# Patient Record
Sex: MALE | NOT HISPANIC OR LATINO | URBAN - METROPOLITAN AREA
[De-identification: names, ages, dates, MRNs, and addresses within clinical notes are randomized per-mention and may not be internally consistent; named-entity substitution may affect disease eponyms.]

---

## 2017-06-22 ENCOUNTER — TEAM CONFERENCE (OUTPATIENT)
Dept: SLEEP MEDICINE | Facility: CLINIC | Age: 67
End: 2017-06-22

## 2017-06-22 RX ORDER — LOSARTAN POTASSIUM 100 MG/1
100 TABLET ORAL DAILY
COMMUNITY

## 2017-06-22 RX ORDER — GLIPIZIDE 10 MG/1
TABLET ORAL
COMMUNITY

## 2017-06-22 RX ORDER — SIMVASTATIN 20 MG
10 TABLET ORAL AT BEDTIME
COMMUNITY

## 2017-06-22 RX ORDER — CHLORTHALIDONE 25 MG/1
25 TABLET ORAL DAILY
COMMUNITY

## 2017-06-22 RX ORDER — METFORMIN HCL 500 MG
2000 TABLET, EXTENDED RELEASE 24 HR ORAL
COMMUNITY

## 2017-06-22 RX ORDER — PAROXETINE 40 MG/1
40 TABLET, FILM COATED ORAL EVERY MORNING
COMMUNITY

## 2017-06-22 RX ORDER — SILDENAFIL 100 MG/1
50 TABLET, FILM COATED ORAL PRN
COMMUNITY

## 2017-06-22 RX ORDER — AMLODIPINE BESYLATE 5 MG/1
2.5 TABLET ORAL DAILY
COMMUNITY

## 2017-06-22 RX ORDER — ATENOLOL 100 MG/1
100 TABLET ORAL DAILY
COMMUNITY

## 2017-06-22 NOTE — TELEPHONE ENCOUNTER
Medication and allergies updated per [facility] referral and chart notes.     Documents were scanned into chart and patient will be called to get scheduled for a consult for hypoglossal nerve stimulator.     LAURIE Nolasco  Clinic Coordinator   Registered Medical Assistant   [facility]

## 2017-06-30 ENCOUNTER — TELEPHONE (OUTPATIENT)
Dept: SLEEP MEDICINE | Facility: CLINIC | Age: 67
End: 2017-06-30

## 2017-06-30 NOTE — TELEPHONE ENCOUNTER
Called patient to get him scheduled for consult.     Wife answered and will pass along my message. I left my direct call back number and office hours.     LAURIE Nolasco  Clinic Coordinator   Registered Medical Assistant   [facility]

## 2017-08-18 ENCOUNTER — TELEPHONE (OUTPATIENT)
Dept: SLEEP MEDICINE | Facility: CLINIC | Age: 67
End: 2017-08-18

## 2017-08-18 NOTE — TELEPHONE ENCOUNTER
Attempted to reach patient to schedule consult for LINDA mills.     Left VM with my call back information.     Asked for him to call and let us know the status of getting this scheduled and if he is or is not interested in completing this.     Will continue to try to reach him.     LAURIE Nolasco  Clinic Coordinator   Registered Medical Assistant   [facility]

## 2017-11-10 ENCOUNTER — TELEPHONE (OUTPATIENT)
Dept: SLEEP MEDICINE | Facility: CLINIC | Age: 67
End: 2017-11-10

## 2017-11-20 ENCOUNTER — DOCUMENTATION ONLY (OUTPATIENT)
Dept: SLEEP MEDICINE | Facility: CLINIC | Age: 67
End: 2017-11-20

## 2017-11-20 NOTE — PROGRESS NOTES
Scheduling letter sent/faxed to [facility] to confirm that pt has been scheduled for sleep study.     LAURIE Nolasco  Sleep Clinic-Specialist,    Registered Medical Assistant   [facility]

## 2017-12-07 ENCOUNTER — OFFICE VISIT (OUTPATIENT)
Dept: SLEEP MEDICINE | Facility: CLINIC | Age: 67
End: 2017-12-07
Attending: INTERNAL MEDICINE
Payer: COMMERCIAL

## 2017-12-07 VITALS
HEIGHT: 71 IN | DIASTOLIC BLOOD PRESSURE: 81 MMHG | OXYGEN SATURATION: 96 % | BODY MASS INDEX: 30.94 KG/M2 | SYSTOLIC BLOOD PRESSURE: 143 MMHG | RESPIRATION RATE: 16 BRPM | WEIGHT: 221 LBS | HEART RATE: 60 BPM

## 2017-12-07 DIAGNOSIS — F51.04 PSYCHOPHYSIOLOGICAL INSOMNIA: ICD-10-CM

## 2017-12-07 DIAGNOSIS — G47.33 OBSTRUCTIVE SLEEP APNEA: Primary | ICD-10-CM

## 2017-12-07 PROCEDURE — 99211 OFF/OP EST MAY X REQ PHY/QHP: CPT | Mod: ZF

## 2017-12-07 RX ORDER — ZOLPIDEM TARTRATE 5 MG/1
TABLET ORAL
Qty: 1 TABLET | Refills: 0 | Status: SHIPPED | OUTPATIENT
Start: 2017-12-07 | End: 2018-02-01

## 2017-12-07 NOTE — PATIENT INSTRUCTIONS
"MY TREATMENT INFORMATION FOR SLEEP APNEA-  Nicholas Broderick    DOCTOR : ROBB  ARSENIOBRADLEY    Return to clinic after your sleep test  Bedtime 1 am  Awakening 9 am      Am I having a sleep study at a sleep center?  Make sure you have an appointment for the study before you leave!    Am I having a home sleep study?  Watch this video:  https://www.[facility].com/watch?v=CteI_GhyP9g&list=[email or web address]    Frequently asked questions:  1. What is Obstructive Sleep Apnea (KRISTA)? KRISTA is the most common type of sleep apnea. Apnea means, \"without breath.\"  Apnea is most often caused by narrowing or collapse of the upper airway as muscles relax during sleep.   Almost everyone has occasional apneas. Most people with sleep apnea have had brief interruptions at night frequently for many years.  The severity of sleep apnea is related to how frequent and severe the events are.   2. What are the consequences of KRISTA? Symptoms include: feeling sleepy during the day, snoring loudly, gasping or stopping of breathing, trouble sleeping, and occasionally morning headaches or heartburn at night.  Sleepiness can be serious and even increase the risk of falling asleep while driving. Other health consequences may include development of high blood pressure and other cardiovascular disease in persons who are susceptible. Untreated KRISTA  can contribute to heart disease, stroke and diabetes.   3. What are the treatment options? In most situations, sleep apnea is a lifelong disease that must be managed with daily therapy. Medications are not effective for sleep apnea and surgery is generally not considered until other therapies have been tried. Your treatment is your choice . Continuous Positive Airway (CPAP) works right away and is the therapy that is effective in nearly everyone. An oral device to hold your jaw forward is usually the next most reliable option. Other options include postioning devices (to keep you off your back), " weight loss, and surgery including a tongue pacing device. There is more detail about some of these options below.    Important tips for using CPAP and similar devices   Know your equipment:  CPAP is continuous positive airway pressure that prevents obstructive sleep apnea by keeping the throat from collapsing while you are sleeping. In most cases, the device is  smart  and can slowly self-adjusts if your throat collapses and keeps a record every day of how well you are treated-this information is available to you and your care team.  BPAP is bilevel positive airway pressure that keeps your throat open and also assists each breath with a pressure boost to maintain adequate breathing.  Special kinds of BPAP are used in patients who have inadequate breathing from lung or heart disease. In most cases, the device is  smart  and can slowly self-adjusts to assist breathing. Like CPAP, the device keeps a record of how well you are treated.  Your mask is your connection to the device. You get to choose what feels most comfortable and the staff will help to make sure if fits. Here: are some examples of the different masks that are available:       Key points to remember on your journey with sleep apnea:  1. Sleep study.  PAP devices often need to be adjusted during a sleep study to show that they are effective and adjusted right.  2. Good tips to remember: Try wearing just the mask during a quiet time during the day so your body adapts to wearing it. A humidifier is recommended for comfort in most cases to prevent drying of your nose and throat. Allergy medication from your provider may help you if you are having nasal congestion.  3. Getting settled-in. It takes more than one night for most of us to get used to wearing a mask. Try wearing just the mask during a quiet time during the day so your body adapts to wearing it. A humidifier is recommended for comfort in most cases. Our team will work with you carefully on the  first day and will be in contact within 4 days and again at 2 and 4 weeks for advice and remote device adjustments. Your therapy is evaluated by the device each day.   4. Use it every night. The more you are able to sleep naturally for 7-8 hours, the more likely you will have good sleep and to prevent health risks or symptoms from sleep apnea. Even if you use it 4 hours it helps. Occasionally all of us are unable to use a medical therapy, in sleep apnea, it is not dangerous to miss one night.   5. Communicate. Call our skilled team on the number provided on the first day if your visit for problems that make it difficult to wear the device. Over 2 out of 3 patients can learn to wear the device long-term with help from our team. Remember to call our team or your sleep providers if you are unable to wear the device as we may have other solutions for those who cannot adapt to mask CPAP therapy. It is recommended that you sleep your sleep provider within the first 3 months and yearly after that if you are not having problems.   Take care of your equipment. Make sure you clean your mask and tubing using directions every day and that your filter and mask are replaced as recommended or if they are not working.     BESIDES CPAP, WHAT OTHER THERAPIES ARE THERE?    Positioning Device  Positioning devices are generally used when sleep apnea is mild and only occurs on your back.This example shows a pillow that straps around the waist. It may be appropriate for those whose sleep study shows milder sleep apnea that occurs primarily when lying flat on one's back. Preliminary studies have shown benefit but effectiveness at home may need to be verified by a home sleep test. These devices are generally not covered by medical insurance.  Examples of devices that maintain sleeping on the back to prevent snoring and mild sleep apnea.    Belt type body positioner  Http://Your.MD/    Electronic  reminder  Http://nightshifttherapy.com/  Http://[email or web address].com.au/    Oral Appliance  What is oral appliance therapy?  An oral appliance device fits on your teeth at night like a retainer used after having braces. The device is made by a specialized dentist and requires several visits over 1-2 months before a manufactured device is made to fit your teeth and is adjusted to prevent your sleep apnea. Once an oral device is working properly, snoring should be improved. A home sleep test may be recommended at that time if to determine whether the sleep apnea is adequately treated.       Some things to remember:  -Oral devices are often, but not always, covered by your medical insurance. Be sure to check with your insurance provider.   -If you are referred for oral therapy, you will be given a list of specialized dentists to consider or you may choose to visit the Web site of the American Academy of Dental Sleep Medicine  -Oral devices are less likely to work if you have severe sleep apnea or are extremely overweight.     More detailed information  An oral appliance is a small acrylic device that fits over the upper and lower teeth  (similar to a retainer or a mouth guard). This device slightly moves jaw forward, which moves the base of the tongue forward, opens the airway, improves breathing for effective treat snoring and obstructive sleep apnea in perhaps 7 out of 10 people .  The best working devices are custom-made by a dental device  after a mold is made of the teeth 1, 2, 3.  When is an oral appliance indicated?  Oral appliance therapy is recommended as a first-line treatment for patients with primary snoring, mild sleep apnea, and for patients with moderate sleep apnea who prefer appliance therapy to use of CPAP4, 5. Severity of sleep apnea is determined by sleep testing and is based on the number of respiratory events per hour of sleep.   How successful is oral appliance therapy?  The success rate  of oral appliance therapy in patients with mild sleep apnea is 75-80% while in patients with moderate sleep apnea it is 50-70%. The chance of success in patients with severe sleep apnea is 40-50%. The research also shows that oral appliances have a beneficial effect on the cardiovascular health of KRISTA patients at the same magnitude as CPAP therapy7.  Oral appliances should be a second-line treatment in cases of severe sleep apnea, but if not completely successful then a combination therapy utilizing CPAP plus oral appliance therapy may be effective. Oral appliances tend to be effective in a broad range of patients although studies show that the patients who have the highest success are females, younger patients, those with milder disease, and less severe obesity. 3, 6.   Finding a dentist that practices dental sleep medicine  Specific training is available through the American Academy of Dental Sleep Medicine for dentists interested in working in the field of sleep. To find a dentist who is educated in the field of sleep and the use of oral appliances, near you, visit the Web site of the American Academy of Dental Sleep Medicine.    References  1. Jackelin et al. Objectively measured vs self-reported compliance during oral appliance therapy for sleep-disordered breathing. Chest 2013; 144(5): 7133-9956.  2. Basia et al. Objective measurement of compliance during oral appliance therapy for sleep-disordered breathing. Thorax 2013; 68(1): 91-96.  3. Johan et al. Mandibular advancement devices in 620 men and women with KRISTA and snoring: tolerability and predictors of treatment success. Chest 2004; 125: 0658-9956.  4. Adiel, et al. Oral appliances for snoring and KRISTA: a review. Sleep 2006; 29: 244-262.  5. Elena et al. Oral appliance treatment for KRISTA: an update. J Clin Sleep Med 2014; 10(2): 215-227.  6. Courtney et al. Predictors of OSAH treatment outcome. J Dent Res 2007; 86:  1468-6809.      Weight Loss:    Weight loss is a long-term strategy that may improve sleep apnea in some patients.    Weight management is a personal decision.  If you are interested in exploring weight loss strategies, the following discussion covers the impact on weight loss on sleep apnea and the approaches that may be successful.    Weight loss decreases severity of sleep apnea in most people with obesity. For those with mild obesity who have developed snoring with weight gain, even 15-30 pound weight loss can improve and occasionally eliminate sleep apnea.  Structured and life-long dietary and health habits are necessary to lose weight and keep healthier weight levels.     Though there may be significant health benefits from weight loss, long-term weight loss is very difficult to achieve- studies show success with dietary management in less than 10% of people. In addition, substantial weight loss may require years of dietary control and may be difficult if patients have severe obesity. In these cases, surgical management may be considered.  Finally, older individuals who have tolerated obesity without health complications may be less likely to benefit from weight loss strategies.        Your BMI is Body mass index is 30.82 kg/(m^2).  Weight management is a personal decision.  If you are interested in exploring weight loss strategies, the following discussion covers the approaches that may be successful. Body mass index (BMI) is one way to tell whether you are at a healthy weight, overweight, or obese. It measures your weight in relation to your height.  A BMI of 18.5 to 24.9 is in the healthy range. A person with a BMI of 25 to 29.9 is considered overweight, and someone with a BMI of 30 or greater is considered obese. More than two-thirds of American adults are considered overweight or obese.  Being overweight or obese increases the risk for further weight gain. Excess weight may lead to heart disease and  diabetes.  Creating and following plans for healthy eating and physical activity may help you improve your health.  Weight control is part of healthy lifestyle and includes exercise, emotional health, and healthy eating habits. Careful eating habits lifelong are the mainstay of weight control. Though there are significant health benefits from weight loss, long-term weight loss with diet alone may be very difficult to achieve- studies show long-term success with dietary management in less than 10% of people. Attaining a healthy weight may be especially difficult to achieve in those with severe obesity. In some cases, medications, devices and surgical management might be considered.  What can you do?  If you are overweight or obese and are interested in methods for weight loss, you should discuss this with your provider.     Consider reducing daily calorie intake by 500 calories.     Keep a food journal.     Avoiding skipping meals, consider cutting portions instead.    Diet combined with exercise helps maintain muscle while optimizing fat loss. Strength training is particularly important for building and maintaining muscle mass. Exercise helps reduce stress, increase energy, and improves fitness. Increasing exercise without diet control, however, may not burn enough calories to loose weight.       Start walking three days a week 10-20 minutes at a time    Work towards walking thirty minutes five days a week     Eventually, increase the speed of your walking for 1-2 minutes at time    In addition, we recommend that you review healthy lifestyles and methods for weight loss available through the National Institutes of Health patient information sites:  http://win.niddk.nih.gov/publications/index.htm    And look into health and wellness programs that may be available through your health insurance provider, employer, local community center, or yudiht club.    Weight management plan: Patient was referred to their PCP to  discuss a diet and exercise plan.      Surgery:    Surgery for obstructive sleep apnea is considered generally only when other therapies fail to work. Surgery may be discussed with you if you are having a difficult time tolerating CPAP and or when there is an abnormal structure that requires surgical correction.  Nose and throat surgeries often enlarge the airway to prevent collapse.  Most of these surgeries create pain for 1-2 weeks and up to half of the most common surgeries are not effective throughout life.  You should carefully discuss the benefits and drawbacks to surgery with your sleep provider and surgeon to determine if it is the best solution for you.   More information  Surgery for KRISTA is directed at areas that are responsible for narrowing or complete obstruction of the airway during sleep.  There are a wide range of procedures available to enlarge and/or stabilize the airway to prevent blockage of breathing in the three major areas where it can occur: the palate, tongue, and nasal regions.  Successful surgical treatment depends on the accurate identification of the factors responsible for obstructive sleep apnea in each person.  A personalized approach is required because there is no single treatment that works well for everyone.  Because of anatomic variation, consultation with an examination by a sleep surgeon is a critical first step in determining what surgical options are best for each patient.  In some cases, examination during sedation may be recommended in order to guide the selection of procedures.  Patients will be counseled about risks and benefits as well as the typical recovery course after surgery. Surgery is typically not a cure for a person s KRISTA.  However, surgery will often significantly improve one s KRISTA severity (termed  success rate ).  Even in the absence of a cure, surgery will decrease the cardiovascular risk associated with OSA7; improve overall quality of life8 (sleepiness,  functionality, sleep quality, etc).      Palate Procedures:  Patients with KRISTA often have narrowing of their airway in the region of their tonsils and uvula.  The goals of palate procedures are to widen the airway in this region as well as to help the tissues resist collapse.  Modern palate procedure techniques focus on tissue conservation and soft tissue rearrangement, rather than tissue removal.  Often the uvula is preserved in this procedure. Residual sleep apnea is common in patient after pharyngoplasty with an average reduction in sleep apnea events of 33%2.      Tongue Procedures:  ExamWhile patients are awake, the muscles that surround the throat are active and keep this region open for breathing. These muscles relax during sleep, allowing the tongue and other structures to collapse and block breathing.  There are several different tongue procedures available.  Selection of a tongue base procedure depends on characteristics seen on physical exam.  Generally, procedures are aimed at removing bulky tissues in this area or preventing the back of the tongue from falling back during sleep.  Success rates for tongue surgery range from 50-62%3.    Hypoglossal Nerve Stimulation:  Hypoglossal nerve stimulation has recently received approval from the [place] Food and Drug Administration for the treatment of obstructive sleep apnea.  This is based on research showing that the system was safe and effective in treating sleep apnea6.  Results showed that the median AHI score decreased 68%, from 29.3 to 9.0. This therapy uses an implant system that senses breathing patterns and delivers mild stimulation to airway muscles, which keeps the airway open during sleep.  The system consists of three fully implanted components: a small generator (similar in size to a pacemaker), a breathing sensor, and a stimulation lead.  Using a small handheld remote, a patient turns the therapy on before bed and off upon awakening.     Candidates for this device must be greater than 22 years of age, have moderate to severe KRISTA (AHI between 20-65), BMI less than 32, have tried CPAP/oral appliance without success, and have appropriate upper airway anatomy (determined by a sleep endoscopy performed by Dr. Pizano).    Hypoglossal Nerve Stimulation Pathway:    The sleep surgeon s office will work with the patient through the insurance prior-authorization process (including communications and appeals).    Nasal Procedures:  Nasal obstruction can interfere with nasal breathing during the day and night.  Studies have shown that relief of nasal obstruction can improve the ability of some patients to tolerate positive airway pressure therapy for obstructive sleep apnea1.  Treatment options include medications such as nasal saline, topical corticosteroid and antihistamine sprays, and oral medications such as antihistamines or decongestants. Non-surgical treatments can include external nasal dilators for selected patients. If these are not successful by themselves, surgery can improve the nasal airway either alone or in combination with these other options.      Combination Procedures:  Combination of surgical procedures and other treatments may be recommended, particularly if patients have more than one area of narrowing or persistent positional disease.  The success rate of combination surgery ranges from 66-80%2,3.    References  1. Jia LASSITER. The Role of the Nose in Snoring and Obstructive Sleep Apnoea: An Update.  Eur Arch Otorhinolaryngol. 2011; 268: 1365-73.  2.  Naresh SM; Lokesh JA; Elvira JR; Pallanch JF; Adri MB; Tio SG; Omari MORENO. Surgical modifications of the upper airway for obstructive sleep apnea in adults: a systematic review and meta-analysis. SLEEP 2010;33(10):3555-0015. Ana BRISENO. Hypopharyngeal surgery in obstructive sleep apnea: an evidence-based medicine review.  Arch Otolaryngol Head Neck Surg. 2006 Feb;132(2):206-13.  3. Chema  YH1, Fanny Y, Feroz BALJINDER. The efficacy of anatomically based multilevel surgery for obstructive sleep apnea. Otolaryngol Head Neck Surg. 2003 Oct;129(4):327-35.  4. Ana BRISENO, Goldberg A. Hypopharyngeal Surgery in Obstructive Sleep Apnea: An Evidence-Based Medicine Review. Arch Otolaryngol Head Neck Surg. 2006 Feb;132(2):206-13.  5. Tylor HAWKINS et al. Upper-Airway Stimulation for Obstructive Sleep Apnea.  N Engl J Med. 2014 Jan 9;370(2):139-49.  6. Amira Y et al. Increased Incidence of Cardiovascular Disease in Middle-aged Men with Obstructive Sleep Apnea. Am J Respir Crit Care Med; 2002 166: 159-165  7. Estevan WALSH et al. Studying Life Effects and Effectiveness of Palatopharyngoplasty (SLEEP) study: Subjective Outcomes of Isolated Uvulopalatopharyngoplasty. Otolaryngol Head Neck Surg. 2011; 144: 623-631.            Your BMI is Body mass index is 30.82 kg/(m^2).  Weight management is a personal decision.  If you are interested in exploring weight loss strategies, the following discussion covers the approaches that may be successful. Body mass index (BMI) is one way to tell whether you are at a healthy weight, overweight, or obese. It measures your weight in relation to your height.  A BMI of 18.5 to 24.9 is in the healthy range. A person with a BMI of 25 to 29.9 is considered overweight, and someone with a BMI of 30 or greater is considered obese. More than two-thirds of American adults are considered overweight or obese.  Being overweight or obese increases the risk for further weight gain. Excess weight may lead to heart disease and diabetes.  Creating and following plans for healthy eating and physical activity may help you improve your health.  Weight control is part of healthy lifestyle and includes exercise, emotional health, and healthy eating habits. Careful eating habits lifelong are the mainstay of weight control. Though there are significant health benefits from weight loss, long-term weight loss with diet  alone may be very difficult to achieve- studies show long-term success with dietary management in less than 10% of people. Attaining a healthy weight may be especially difficult to achieve in those with severe obesity. In some cases, medications, devices and surgical management might be considered.  What can you do?  If you are overweight or obese and are interested in methods for weight loss, you should discuss this with your provider.     Consider reducing daily calorie intake by 500 calories.     Keep a food journal.     Avoiding skipping meals, consider cutting portions instead.    Diet combined with exercise helps maintain muscle while optimizing fat loss. Strength training is particularly important for building and maintaining muscle mass. Exercise helps reduce stress, increase energy, and improves fitness. Increasing exercise without diet control, however, may not burn enough calories to loose weight.       Start walking three days a week 10-20 minutes at a time    Work towards walking thirty minutes five days a week     Eventually, increase the speed of your walking for 1-2 minutes at time    In addition, we recommend that you review healthy lifestyles and methods for weight loss available through the National Institutes of Health patient information sites:  http://win.niddk.nih.gov/publications/index.htm    And look into health and wellness programs that may be available through your health insurance provider, employer, local community center, or yudith club.    Weight management plan: Patient was referred to their PCP to discuss a diet and exercise plan.     Your blood pressure was checked while you were in clinic today.  Please read the guidelines below about what these numbers mean and what you should do about them.  Your systolic blood pressure is the top number.  This is the pressure when the heart is pumping.  Your diastolic blood pressure is the bottom number.  This is the pressure in between  beats.  If your systolic blood pressure is less than 120 and your diastolic blood pressure is less than 80, then your blood pressure is normal. There is nothing more that you need to do about it  If your systolic blood pressure is 120-139 or your diastolic blood pressure is 80-89, your blood pressure may be higher than it should be.  You should have your blood pressure re-checked within a year by a primary care provider.  If your systolic blood pressure is 140 or greater or your diastolic blood pressure is 90 or greater, you may have high blood pressure.  High blood pressure is treatable, but if left untreated over time it can put you at risk for heart attack, stroke, or kidney failure.  You should have your blood pressure re-checked by a primary care provider within the next four weeks.

## 2017-12-07 NOTE — NURSING NOTE
"Chief Complaint   Patient presents with     Consult     Discuss possible sleep apnea       Initial /81  Pulse 60  Resp 16  Ht 1.803 m (5' 11\")  Wt 100.2 kg (221 lb)  SpO2 96%  BMI 30.82 kg/m2 Estimated body mass index is 30.82 kg/(m^2) as calculated from the following:    Height as of this encounter: 1.803 m (5' 11\").    Weight as of this encounter: 100.2 kg (221 lb).  Medication Reconciliation: complete     LAURIE Arnold        "

## 2017-12-07 NOTE — MR AVS SNAPSHOT
"              After Visit Summary   12/7/2017    Nicholas Broderick    MRN: [id number]           Patient Information     Date Of Birth          1950        Visit Information        Provider Department      12/7/2017 10:00 AM Gareth Pham MD [facility], [place], Sleep Study        Today's Diagnoses     Obstructive sleep apnea    -  1    Psychophysiological insomnia          Care Instructions    MY TREATMENT INFORMATION FOR SLEEP APNEA-  Nicholas Broderick    DOCTOR : GARETH PHAM    Return to clinic after your sleep test  Bedtime 1 am  Awakening 9 am      Am I having a sleep study at a sleep center?  Make sure you have an appointment for the study before you leave!    Am I having a home sleep study?  Watch this video:  https://www.[employer].com/watch?v=CteI_GhyP9g&list=[email or web address]    Frequently asked questions:  1. What is Obstructive Sleep Apnea (KRISTA)? KRISTA is the most common type of sleep apnea. Apnea means, \"without breath.\"  Apnea is most often caused by narrowing or collapse of the upper airway as muscles relax during sleep.   Almost everyone has occasional apneas. Most people with sleep apnea have had brief interruptions at night frequently for many years.  The severity of sleep apnea is related to how frequent and severe the events are.   2. What are the consequences of KRISTA? Symptoms include: feeling sleepy during the day, snoring loudly, gasping or stopping of breathing, trouble sleeping, and occasionally morning headaches or heartburn at night.  Sleepiness can be serious and even increase the risk of falling asleep while driving. Other health consequences may include development of high blood pressure and other cardiovascular disease in persons who are susceptible. Untreated KRISTA  can contribute to heart disease, stroke and diabetes.   3. What are the treatment options? In most situations, sleep apnea is a lifelong disease that must be managed with daily therapy. Medications are not " effective for sleep apnea and surgery is generally not considered until other therapies have been tried. Your treatment is your choice . Continuous Positive Airway (CPAP) works right away and is the therapy that is effective in nearly everyone. An oral device to hold your jaw forward is usually the next most reliable option. Other options include postioning devices (to keep you off your back), weight loss, and surgery including a tongue pacing device. There is more detail about some of these options below.    Important tips for using CPAP and similar devices   Know your equipment:  CPAP is continuous positive airway pressure that prevents obstructive sleep apnea by keeping the throat from collapsing while you are sleeping. In most cases, the device is  smart  and can slowly self-adjusts if your throat collapses and keeps a record every day of how well you are treated-this information is available to you and your care team.  BPAP is bilevel positive airway pressure that keeps your throat open and also assists each breath with a pressure boost to maintain adequate breathing.  Special kinds of BPAP are used in patients who have inadequate breathing from lung or heart disease. In most cases, the device is  smart  and can slowly self-adjusts to assist breathing. Like CPAP, the device keeps a record of how well you are treated.  Your mask is your connection to the device. You get to choose what feels most comfortable and the staff will help to make sure if fits. Here: are some examples of the different masks that are available:       Key points to remember on your journey with sleep apnea:  1. Sleep study.  PAP devices often need to be adjusted during a sleep study to show that they are effective and adjusted right.  2. Good tips to remember: Try wearing just the mask during a quiet time during the day so your body adapts to wearing it. A humidifier is recommended for comfort in most cases to prevent drying of your nose  and throat. Allergy medication from your provider may help you if you are having nasal congestion.  3. Getting settled-in. It takes more than one night for most of us to get used to wearing a mask. Try wearing just the mask during a quiet time during the day so your body adapts to wearing it. A humidifier is recommended for comfort in most cases. Our team will work with you carefully on the first day and will be in contact within 4 days and again at 2 and 4 weeks for advice and remote device adjustments. Your therapy is evaluated by the device each day.   4. Use it every night. The more you are able to sleep naturally for 7-8 hours, the more likely you will have good sleep and to prevent health risks or symptoms from sleep apnea. Even if you use it 4 hours it helps. Occasionally all of us are unable to use a medical therapy, in sleep apnea, it is not dangerous to miss one night.   5. Communicate. Call our skilled team on the number provided on the first day if your visit for problems that make it difficult to wear the device. Over 2 out of 3 patients can learn to wear the device long-term with help from our team. Remember to call our team or your sleep providers if you are unable to wear the device as we may have other solutions for those who cannot adapt to mask CPAP therapy. It is recommended that you sleep your sleep provider within the first 3 months and yearly after that if you are not having problems.   Take care of your equipment. Make sure you clean your mask and tubing using directions every day and that your filter and mask are replaced as recommended or if they are not working.     BESIDES CPAP, WHAT OTHER THERAPIES ARE THERE?    Positioning Device  Positioning devices are generally used when sleep apnea is mild and only occurs on your back.This example shows a pillow that straps around the waist. It may be appropriate for those whose sleep study shows milder sleep apnea that occurs primarily when lying  flat on one's back. Preliminary studies have shown benefit but effectiveness at home may need to be verified by a home sleep test. These devices are generally not covered by medical insurance.  Examples of devices that maintain sleeping on the back to prevent snoring and mild sleep apnea.    Belt type body positioner  Http://[employer].Mix[employer]/    Electronic reminder  Http://nightshifttherapy.com/  Http://www.[employer].Mixify.au/    Oral Appliance  What is oral appliance therapy?  An oral appliance device fits on your teeth at night like a retainer used after having braces. The device is made by a specialized dentist and requires several visits over 1-2 months before a manufactured device is made to fit your teeth and is adjusted to prevent your sleep apnea. Once an oral device is working properly, snoring should be improved. A home sleep test may be recommended at that time if to determine whether the sleep apnea is adequately treated.       Some things to remember:  -Oral devices are often, but not always, covered by your medical insurance. Be sure to check with your insurance provider.   -If you are referred for oral therapy, you will be given a list of specialized dentists to consider or you may choose to visit the Web site of the American Academy of Dental Sleep Medicine  -Oral devices are less likely to work if you have severe sleep apnea or are extremely overweight.     More detailed information  An oral appliance is a small acrylic device that fits over the upper and lower teeth  (similar to a retainer or a mouth guard). This device slightly moves jaw forward, which moves the base of the tongue forward, opens the airway, improves breathing for effective treat snoring and obstructive sleep apnea in perhaps 7 out of 10 people .  The best working devices are custom-made by a dental device  after a mold is made of the teeth 1, 2, 3.  When is an oral appliance indicated?  Oral appliance therapy is recommended as  a first-line treatment for patients with primary snoring, mild sleep apnea, and for patients with moderate sleep apnea who prefer appliance therapy to use of CPAP4, 5. Severity of sleep apnea is determined by sleep testing and is based on the number of respiratory events per hour of sleep.   How successful is oral appliance therapy?  The success rate of oral appliance therapy in patients with mild sleep apnea is 75-80% while in patients with moderate sleep apnea it is 50-70%. The chance of success in patients with severe sleep apnea is 40-50%. The research also shows that oral appliances have a beneficial effect on the cardiovascular health of KRISTA patients at the same magnitude as CPAP therapy7.  Oral appliances should be a second-line treatment in cases of severe sleep apnea, but if not completely successful then a combination therapy utilizing CPAP plus oral appliance therapy may be effective. Oral appliances tend to be effective in a broad range of patients although studies show that the patients who have the highest success are females, younger patients, those with milder disease, and less severe obesity. 3, 6.   Finding a dentist that practices dental sleep medicine  Specific training is available through the American Academy of Dental Sleep Medicine for dentists interested in working in the field of sleep. To find a dentist who is educated in the field of sleep and the use of oral appliances, near you, visit the Web site of the American Academy of Dental Sleep Medicine.    References  1. Jackelin et al. Objectively measured vs self-reported compliance during oral appliance therapy for sleep-disordered breathing. Chest 2013; 144(5): 0440-8709.  2. Basia et al. Objective measurement of compliance during oral appliance therapy for sleep-disordered breathing. Thorax 2013; 68(1): 91-96.  3. Johan et al. Mandibular advancement devices in 620 men and women with KRISTA and snoring: tolerability and  predictors of treatment success. Chest 2004; 125: 2245-5973.  4. Adiel et al. Oral appliances for snoring and KRISTA: a review. Sleep 2006; 29: 244-262.  5. Elena et al. Oral appliance treatment for KRISTA: an update. J Clin Sleep Med 2014; 10(2): 215-227.  6. Courtney et al. Predictors of OSAH treatment outcome. J Dent Res 2007; 86: 1644-1505.      Weight Loss:    Weight loss is a long-term strategy that may improve sleep apnea in some patients.    Weight management is a personal decision.  If you are interested in exploring weight loss strategies, the following discussion covers the impact on weight loss on sleep apnea and the approaches that may be successful.    Weight loss decreases severity of sleep apnea in most people with obesity. For those with mild obesity who have developed snoring with weight gain, even 15-30 pound weight loss can improve and occasionally eliminate sleep apnea.  Structured and life-long dietary and health habits are necessary to lose weight and keep healthier weight levels.     Though there may be significant health benefits from weight loss, long-term weight loss is very difficult to achieve- studies show success with dietary management in less than 10% of people. In addition, substantial weight loss may require years of dietary control and may be difficult if patients have severe obesity. In these cases, surgical management may be considered.  Finally, older individuals who have tolerated obesity without health complications may be less likely to benefit from weight loss strategies.        Your BMI is Body mass index is 30.82 kg/(m^2).  Weight management is a personal decision.  If you are interested in exploring weight loss strategies, the following discussion covers the approaches that may be successful. Body mass index (BMI) is one way to tell whether you are at a healthy weight, overweight, or obese. It measures your weight in relation to your height.  A BMI of 18.5 to 24.9 is  in the healthy range. A person with a BMI of 25 to 29.9 is considered overweight, and someone with a BMI of 30 or greater is considered obese. More than two-thirds of American adults are considered overweight or obese.  Being overweight or obese increases the risk for further weight gain. Excess weight may lead to heart disease and diabetes.  Creating and following plans for healthy eating and physical activity may help you improve your health.  Weight control is part of healthy lifestyle and includes exercise, emotional health, and healthy eating habits. Careful eating habits lifelong are the mainstay of weight control. Though there are significant health benefits from weight loss, long-term weight loss with diet alone may be very difficult to achieve- studies show long-term success with dietary management in less than 10% of people. Attaining a healthy weight may be especially difficult to achieve in those with severe obesity. In some cases, medications, devices and surgical management might be considered.  What can you do?  If you are overweight or obese and are interested in methods for weight loss, you should discuss this with your provider.     Consider reducing daily calorie intake by 500 calories.     Keep a food journal.     Avoiding skipping meals, consider cutting portions instead.    Diet combined with exercise helps maintain muscle while optimizing fat loss. Strength training is particularly important for building and maintaining muscle mass. Exercise helps reduce stress, increase energy, and improves fitness. Increasing exercise without diet control, however, may not burn enough calories to loose weight.       Start walking three days a week 10-20 minutes at a time    Work towards walking thirty minutes five days a week     Eventually, increase the speed of your walking for 1-2 minutes at time    In addition, we recommend that you review healthy lifestyles and methods for weight loss available through  the National Institutes of Health patient information sites:  http://win.niddk.nih.gov/publications/index.htm    And look into health and wellness programs that may be available through your health insurance provider, employer, local community center, or [employer].    Weight management plan: Patient was referred to their PCP to discuss a diet and exercise plan.      Surgery:    Surgery for obstructive sleep apnea is considered generally only when other therapies fail to work. Surgery may be discussed with you if you are having a difficult time tolerating CPAP and or when there is an abnormal structure that requires surgical correction.  Nose and throat surgeries often enlarge the airway to prevent collapse.  Most of these surgeries create pain for 1-2 weeks and up to half of the most common surgeries are not effective throughout life.  You should carefully discuss the benefits and drawbacks to surgery with your sleep provider and surgeon to determine if it is the best solution for you.   More information  Surgery for KRISTA is directed at areas that are responsible for narrowing or complete obstruction of the airway during sleep.  There are a wide range of procedures available to enlarge and/or stabilize the airway to prevent blockage of breathing in the three major areas where it can occur: the palate, tongue, and nasal regions.  Successful surgical treatment depends on the accurate identification of the factors responsible for obstructive sleep apnea in each person.  A personalized approach is required because there is no single treatment that works well for everyone.  Because of anatomic variation, consultation with an examination by a sleep surgeon is a critical first step in determining what surgical options are best for each patient.  In some cases, examination during sedation may be recommended in order to guide the selection of procedures.  Patients will be counseled about risks and benefits as well as the  typical recovery course after surgery. Surgery is typically not a cure for a person s KRISTA.  However, surgery will often significantly improve one s KRISTA severity (termed  success rate ).  Even in the absence of a cure, surgery will decrease the cardiovascular risk associated with OSA7; improve overall quality of life8 (sleepiness, functionality, sleep quality, etc).      Palate Procedures:  Patients with KRISTA often have narrowing of their airway in the region of their tonsils and uvula.  The goals of palate procedures are to widen the airway in this region as well as to help the tissues resist collapse.  Modern palate procedure techniques focus on tissue conservation and soft tissue rearrangement, rather than tissue removal.  Often the uvula is preserved in this procedure. Residual sleep apnea is common in patient after pharyngoplasty with an average reduction in sleep apnea events of 33%2.      Tongue Procedures:  ExamWhile patients are awake, the muscles that surround the throat are active and keep this region open for breathing. These muscles relax during sleep, allowing the tongue and other structures to collapse and block breathing.  There are several different tongue procedures available.  Selection of a tongue base procedure depends on characteristics seen on physical exam.  Generally, procedures are aimed at removing bulky tissues in this area or preventing the back of the tongue from falling back during sleep.  Success rates for tongue surgery range from 50-62%3.    Hypoglossal Nerve Stimulation:  Hypoglossal nerve stimulation has recently received approval from the [place] Food and Drug Administration for the treatment of obstructive sleep apnea.  This is based on research showing that the system was safe and effective in treating sleep apnea6.  Results showed that the median AHI score decreased 68%, from 29.3 to 9.0. This therapy uses an implant system that senses breathing patterns and delivers mild  stimulation to airway muscles, which keeps the airway open during sleep.  The system consists of three fully implanted components: a small generator (similar in size to a pacemaker), a breathing sensor, and a stimulation lead.  Using a small handheld remote, a patient turns the therapy on before bed and off upon awakening.    Candidates for this device must be greater than 22 years of age, have moderate to severe KRISTA (AHI between 20-65), BMI less than 32, have tried CPAP/oral appliance without success, and have appropriate upper airway anatomy (determined by a sleep endoscopy performed by Dr. Pizano).    Hypoglossal Nerve Stimulation Pathway:    The sleep surgeon s office will work with the patient through the insurance prior-authorization process (including communications and appeals).    Nasal Procedures:  Nasal obstruction can interfere with nasal breathing during the day and night.  Studies have shown that relief of nasal obstruction can improve the ability of some patients to tolerate positive airway pressure therapy for obstructive sleep apnea1.  Treatment options include medications such as nasal saline, topical corticosteroid and antihistamine sprays, and oral medications such as antihistamines or decongestants. Non-surgical treatments can include external nasal dilators for selected patients. If these are not successful by themselves, surgery can improve the nasal airway either alone or in combination with these other options.      Combination Procedures:  Combination of surgical procedures and other treatments may be recommended, particularly if patients have more than one area of narrowing or persistent positional disease.  The success rate of combination surgery ranges from 66-80%2,3.    References  1. Jia LASSITER. The Role of the Nose in Snoring and Obstructive Sleep Apnoea: An Update.  Eur Arch Otorhinolaryngol. 2011; 268: 1365-73.  2.  Naresh SM; Lokesh JA; Elvira JR; Pallanch JF; Adri MENEZES; Tio SG;  Omari MORENO. Surgical modifications of the upper airway for obstructive sleep apnea in adults: a systematic review and meta-analysis. SLEEP 2010;33(10):7440-7081. Ana BRISENO. Hypopharyngeal surgery in obstructive sleep apnea: an evidence-based medicine review.  Arch Otolaryngol Head Neck Surg. 2006 Feb;132(2):206-13.  3. Chema YH1, Fanny Y, Feroz BALJINDER. The efficacy of anatomically based multilevel surgery for obstructive sleep apnea. Otolaryngol Head Neck Surg. 2003 Oct;129(4):327-35.  4. Kezirian E, Goldberg A. Hypopharyngeal Surgery in Obstructive Sleep Apnea: An Evidence-Based Medicine Review. Arch Otolaryngol Head Neck Surg. 2006 Feb;132(2):206-13.  5. Tylor HAWKINS et al. Upper-Airway Stimulation for Obstructive Sleep Apnea.  N Engl J Med. 2014 Jan 9;370(2):139-49.  6. Amira Y et al. Increased Incidence of Cardiovascular Disease in Middle-aged Men with Obstructive Sleep Apnea. Am J Respir Crit Care Med; 2002 166: 159-165  7. Barreto EM et al. Studying Life Effects and Effectiveness of Palatopharyngoplasty (SLEEP) study: Subjective Outcomes of Isolated Uvulopalatopharyngoplasty. Otolaryngol Head Neck Surg. 2011; 144: 623-631.            Your BMI is Body mass index is 30.82 kg/(m^2).  Weight management is a personal decision.  If you are interested in exploring weight loss strategies, the following discussion covers the approaches that may be successful. Body mass index (BMI) is one way to tell whether you are at a healthy weight, overweight, or obese. It measures your weight in relation to your height.  A BMI of 18.5 to 24.9 is in the healthy range. A person with a BMI of 25 to 29.9 is considered overweight, and someone with a BMI of 30 or greater is considered obese. More than two-thirds of American adults are considered overweight or obese.  Being overweight or obese increases the risk for further weight gain. Excess weight may lead to heart disease and diabetes.  Creating and following plans for healthy eating and  physical activity may help you improve your health.  Weight control is part of healthy lifestyle and includes exercise, emotional health, and healthy eating habits. Careful eating habits lifelong are the mainstay of weight control. Though there are significant health benefits from weight loss, long-term weight loss with diet alone may be very difficult to achieve- studies show long-term success with dietary management in less than 10% of people. Attaining a healthy weight may be especially difficult to achieve in those with severe obesity. In some cases, medications, devices and surgical management might be considered.  What can you do?  If you are overweight or obese and are interested in methods for weight loss, you should discuss this with your provider.     Consider reducing daily calorie intake by 500 calories.     Keep a food journal.     Avoiding skipping meals, consider cutting portions instead.    Diet combined with exercise helps maintain muscle while optimizing fat loss. Strength training is particularly important for building and maintaining muscle mass. Exercise helps reduce stress, increase energy, and improves fitness. Increasing exercise without diet control, however, may not burn enough calories to loose weight.       Start walking three days a week 10-20 minutes at a time    Work towards walking thirty minutes five days a week     Eventually, increase the speed of your walking for 1-2 minutes at time    In addition, we recommend that you review healthy lifestyles and methods for weight loss available through the National Institutes of Health patient information sites:  http://win.niddk.nih.gov/publications/index.htm    And look into health and wellness programs that may be available through your health insurance provider, employer, local community center, or [employer].    Weight management plan: Patient was referred to their PCP to discuss a diet and exercise plan.     Your blood pressure was  checked while you were in clinic today.  Please read the guidelines below about what these numbers mean and what you should do about them.  Your systolic blood pressure is the top number.  This is the pressure when the heart is pumping.  Your diastolic blood pressure is the bottom number.  This is the pressure in between beats.  If your systolic blood pressure is less than 120 and your diastolic blood pressure is less than 80, then your blood pressure is normal. There is nothing more that you need to do about it  If your systolic blood pressure is 120-139 or your diastolic blood pressure is 80-89, your blood pressure may be higher than it should be.  You should have your blood pressure re-checked within a year by a primary care provider.  If your systolic blood pressure is 140 or greater or your diastolic blood pressure is 90 or greater, you may have high blood pressure.  High blood pressure is treatable, but if left untreated over time it can put you at risk for heart attack, stroke, or kidney failure.  You should have your blood pressure re-checked by a primary care provider within the next four weeks.                Follow-ups after your visit        Follow-up notes from your care team     Return in about 1 week (around 12/14/2017), or after study.      Your next 10 appointments already scheduled     Dec 28, 2017 10:30 AM CST   Actigraphy Pickup with SLEEP STUDY  7   [facility], Sleep Study ([facility])    [address] [place] [id number]   [phone]            Jan 11, 2018  8:00 PM CST   PSG Diagnostic with SLEEP STUDY RM 2   [facility], Sleep Study ([facility])    [address] [place] [id number]   [phone]            Feb 01, 2018 11:30 AM CST   Return Sleep Patient with Gareth Pham MD   [facility], Sleep Study ([facility])    [address] [place] [address]  "[facility] [address] [phone]              Future tests that were ordered for you today     Open Future Orders        Priority Expected Expires Ordered    Comprehensive Sleep Study Routine  2018            Who to contact     If you have questions or need follow up information about today's clinic visit or your schedule please [facility] directly at [phone].  Normal or non-critical lab and imaging results will be communicated to you by Ideabovehart, letter or phone within 4 business days after the clinic has received the results. If you do not hear from us within 7 days, please contact the clinic through Ideabovehart or phone. If you have a critical or abnormal lab result, we will notify you by phone as soon as possible.  Submit refill requests through Webupo or call your pharmacy and they will forward the refill request to us. Please allow 3 business days for your refill to be completed.          Additional Information About Your Visit        Webupo Information     Webupo lets you send messages to your doctor, view your test results, renew your prescriptions, schedule appointments and more. To sign up, go to [email or web address] . Click on \"Log in\" on the left side of the screen, which will take you to the Welcome page. Then click on \"Sign up Now\" on the right side of the page.     You will be asked to enter the access code listed below, as well as some personal information. Please follow the directions to create your username and password.     Your access code is: [email or web address]  Expires: 3/7/2018 11:14 AM     Your access code will  in 90 days. If you need help or a new code, please call your [place] clinic or [phone].        Care EveryWhere ID     This is your Care EveryWhere ID. This could be used by other organizations to access your [place] medical records  [id number]        Your Vitals Were     Pulse Respirations Height Pulse Oximetry BMI " "(Body Mass Index)       60 16 1.803 m (5' 11\") 96% 30.82 kg/m2        Blood Pressure from Last 3 Encounters:   12/07/17 143/81    Weight from Last 3 Encounters:   12/07/17 100.2 kg (221 lb)                 Today's Medication Changes          These changes are accurate as of: 12/7/17 11:29 AM.  If you have any questions, ask your nurse or doctor.               Start taking these medicines.        Dose/Directions    zolpidem 5 MG tablet   Commonly known as:  AMBIEN   Used for:  Psychophysiological insomnia        Take tablet by mouth 15 minutes prior to sleep, for Sleep Study   Quantity:  1 tablet   Refills:  0            Where to get your medicines      Some of these will need a paper prescription and others can be bought over the counter.  Ask your nurse if you have questions.     Bring a paper prescription for each of these medications     zolpidem 5 MG tablet                Primary Care Provider Fax #    Provider Not In System [phone]                Equal Access to Services     [place] : Hadii mickie stoner [place], waaxda luqadaha, qaybta kaalmada adecandiyada, rain panchal . So [facility] [phone].    ATENCIÓN: Si habla español, tiene a adair disposición servicios gratuitos de asistencia lingüística. Llame al [phone].    We comply with applicable federal civil rights laws and [place] laws. We do not discriminate on the basis of race, color, national origin, age, disability, sex, sexual orientation, or gender identity.            Thank you!     Thank you for choosing [facility] SLEEP STUDY  for your care. Our goal is always to provide you with excellent care. Hearing back from our patients is one way we can continue to improve our services. Please take a few minutes to complete the written survey that you may receive in the mail after your visit with us. Thank you!             Your Updated Medication List - Protect others around you: Learn how to safely use, store and " throw away your medicines at www.disposemymeds.org.          This list is accurate as of: 12/7/17 11:29 AM.  Always use your most recent med list.                   Brand Name Dispense Instructions for use Diagnosis    amLODIPine 5 MG tablet    NORVASC     Take 2.5 mg by mouth daily        atenolol 100 MG tablet    TENORMIN     Take 100 mg by mouth daily        blood glucose monitoring test strip    no brand specified     Use to test blood sugar 2 times weekly or as directed.        chlorthalidone 25 MG tablet    HYGROTON     Take 25 mg by mouth daily        glipiZIDE 10 MG tablet    GLUCOTROL     Take 2 tablets p.o every morning and 2 tablets every evening to decrease blood sugar. * Take 30 minutes prior to meal        losartan 100 MG tablet    COZAAR     Take 100 mg by mouth daily        metFORMIN 500 MG 24 hr tablet    GLUCOPHAGE-XR     Take 2,000 mg by mouth daily (with dinner)        PARoxetine 40 MG tablet    PAXIL     Take 40 mg by mouth every morning        sildenafil 100 MG tablet    VIAGRA     Take 50 mg by mouth as needed for erectile dysfunction        simvastatin 20 MG tablet    ZOCOR     Take 10 mg by mouth At Bedtime        zolpidem 5 MG tablet    AMBIEN    1 tablet    Take tablet by mouth 15 minutes prior to sleep, for Sleep Study    Psychophysiological insomnia

## 2017-12-07 NOTE — PROGRESS NOTES
DATE OF SERVICE:  12/07/2017      Nicholas Broderick is a 67-year-old man with a lifelong history of delayed sleep phase and sleep initiation insomnia who also has documented obstructive sleep apnea for which he would like evaluation for alternative therapies due to CPAP intolerance.      1.  Delayed sleep phase and psycho-physiological insomnia.  Throughout the second and third decade of his life when he was working regularly he had natural awakening time on weekends of 9 a.m. and natural sleep onset time between 12-1:00 a.m.  He was forced to awaken at 5:00 a.m. for his work schedule and found it difficult awakening in the morning and throughout his life he has had difficulty sleeping at night.  In an attempt to correct this problem he has advanced his bedtime to 10 or 11 in the evening, lies awake reading quietly for up to 2-3 hours until 1:00 in the morning when he is finally able to fall asleep.  During this time if he is not reading he often has evidence of psycho-physiological insomnia with excessive rumination often regarding fixing problems and planning activities.  This has persisted even after his active work life.  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  He continues to have these habits at night.  Less frequently worries at night, although he does indicate an intent to fall asleep, which is sometimes improved by moving to a different room.  His bedroom is quiet and cool and although he shares a bed with a life partner, she does not bother his sleep.  In the past, they have negotiated not having a television on at night and/or a light on in the room.   2.  Obstructive sleep apnea.  This diagnosis was based on symptoms of daytime sleepiness, nighttime snoring, interrupted breathing and a peripheral arterial tonometry study showing a respiratory event index of 43 per hour.  This patient has never been able to wear CPAP prescribed in 2012 at the time of the sleep study  nor in the ensuing 3 or 4 years that he attempted to use different masks.  In addition, he bought an over-the-counter mandibular advancement device, which he found uncomfortable to wear and [facility] advised [facility] that removal of teeth and dental implants might be necessary and even then problems related to his previous jaw fracture and asymmetries may make this difficult.      This patient has no complaints of abnormal nighttime behaviors other than wakefulness.  He has not had dream enactment and does not have vocalizations or sleep walking and is not bothered by motor restlessness or sensory changes in his legs at night.  He notes that his sleep is disrupted by frequent sleep movements which he ascribes to his snoring and respiratory events at night.      PAST MEDICAL HISTORY:   1.  Hypercholesterolemia, treated with simvastatin.     2.  Mood disorder, currently on paroxetine.   3.  Hypertension, treated with losartan, chlorthalidone, atenolol and amlodipine.   4.  Diabetes, currently on glipizide and metformin.      FAMILY HISTORY:  There is no family history of obstructive sleep apnea and he is uncertain about sleep schedule of his parents.      REVIEW OF SYSTEMS:  A 10-point review of systems is negative with the exception of history of present illness.      PHYSICAL EXAMINATION:   GENERAL:  This is a healthy-appearing male with significant retrognathia missing posterior molars on the mandible and some asymmetry of mandibular size, Mallampati 2.   VITAL SIGNS:  Blood pressure 143/81.  Body mass index 30.9.   NECK:  Circumference 16.5 inches.   LUNGS:  Fields are clear.   HEART:  Regular rate and rhythm without murmurs noted.  There is no peripheral edema.     PSYCH:  The patient's emotional engagement is normal.   NEUROLOGIC:  No focal neurologic findings.      ASSESSMENT:   1.  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   2.  Severe obstructive sleep apnea.   3.  Hypertension.   4.  Diabetes mellitus on oral hypoglycemics.   5.  Hypercholesterolemia.   6.  Obesity.      PLAN:     1.  We have counseled the patient in keeping actigraphy over the next 2 weeks, a sleep diary and regularizing his sleep in the context of his circadian rhythm of the delayed bedtime until 1:00 a.m., awakening time without an alarm clock at 9:00 a.m. and return to quiet reading if he is awakened during the night outside the bedroom.   2.  Sleep studies without CPAP, given his long-term intolerance with the use of a sleep promoting agent 5 mg of zolpidem.   3.  Consider evaluation for neurostimulator therapy after initial management of his major complaint of insomnia and further evaluation for appropriateness of neurostimulator therapy in the setting of his CPAP intolerance.      Total time 60 minutes, greater than 50% counseling.         ROBB LINARES MD             D: 2017 11:28   T: 2017 12:58   MT: ANNETTE      Name:     TORI BASILIO   MRN:      [id number]        Account:      [id number]   :      1950           Visit Date:   2017      Document: [id number]       cc: Jerrod Cardona MD

## 2017-12-08 ENCOUNTER — DOCUMENTATION ONLY (OUTPATIENT)
Dept: SLEEP MEDICINE | Facility: CLINIC | Age: 67
End: 2017-12-08

## 2017-12-08 NOTE — PROGRESS NOTES
[facility] of final[facility] and [facility].     Scheduling letter sent/faxed to [facility] to confirm that pt has been scheduled for sleep study and follow up.     LAURIE Nolasco  Sleep Clinic-Specialist,    Registered Medical Assistant   [facility]

## 2017-12-08 NOTE — LETTER
2017  Dear Yahir Varela Khalil, Randi Sanchez, and Mendy Charles:  Your patient: Victor Hugo Broderick  : 1950  Last four SSN: [id number]  Is scheduled for a sleep study at [facility]   Scheduled Dates: 2017 @ 10:30am for actigraphy , 2018 @ 8pm for Sleep study. And 2018 @ 11:30 with Dr. Gareth Pham for results.   Please make sure that the patient brings a sleep aid if you think that they may need one for the study.  Final results will be faxed to: [phone]  Patient will also have a copy of the final results mailed to them.  Final results will not be discussed with patient if no follow up in our center is requested.  Patient is instructed to follow up with you for final result and further recommendations/orders, etc.  Please contact our  staff directly with questions/concerns.  Phone [phone], Fax [phone]  Thank you for the referral.  Sincerely,   Yamilet Hawkins MD  Medical Director [place] [facility]  Gareth Pham MD  Medical Director [place] [facility]

## 2017-12-28 ENCOUNTER — OFFICE VISIT (OUTPATIENT)
Dept: SLEEP MEDICINE | Facility: CLINIC | Age: 67
End: 2017-12-28
Attending: INTERNAL MEDICINE
Payer: COMMERCIAL

## 2017-12-28 DIAGNOSIS — G47.33 OBSTRUCTIVE SLEEP APNEA: Primary | ICD-10-CM

## 2017-12-28 PROCEDURE — 95803 ACTIGRAPHY TESTING: CPT | Mod: ZF

## 2017-12-28 NOTE — MR AVS SNAPSHOT
"              After Visit Summary   12/28/2017    Nicholas Broderick    MRN: [id number]           Patient Information     Date Of Birth          1950        Visit Information        Provider Department      12/28/2017 10:30 AM SLEEP STUDY RM 7 [facility]        Today's Diagnoses     Obstructive sleep apnea    -  1       Follow-ups after your visit        Your next 10 appointments already scheduled     Jan 11, 2018  8:00 PM CST   PSG Diagnostic with SLEEP STUDY RM 2   [facility]    [address] [place] [id number] [phone]            Feb 01, 2018 11:30 AM CST   Return Sleep Patient with Gareth Pham MD   [facility]    [address] [place] [id number] [phone]              Who to contact     If you have questions or need follow up information about today's clinic visit or your schedule please contact [facility] directly at [phone].  Normal or non-critical lab and imaging results will be communicated to you by Voice Assisthart, letter or phone within 4 business days after the clinic has received the results. If you do not hear from us within 7 days, please contact the clinic through Biomatricat or phone. If you have a critical or abnormal lab result, we will notify you by phone as soon as possible.  Submit refill requests through [employer] or call your pharmacy and they will forward the refill request to us. Please allow 3 business days for your refill to be completed.          Additional Information About Your Visit        Voice Assisthart Information     [employer] lets you send messages to your doctor, view your test results, renew your prescriptions, schedule appointments and more. To sign up, go to [email or web address] . Click on \"Log in\" on the left side of the screen, which will take you to the Welcome page. " "Then click on \"Sign up Now\" on the right side of the page.     You will be asked to enter the access code listed below, as well as some personal information. Please follow the directions to create your username and password.     Your access code is: [email or web address]  Expires: 3/7/2018 11:14 AM     Your access code will  in 90 days. If you need help or a new code, please call your [place] clinic or [phone].        Care EveryWhere ID     This is your Care EveryWhere ID. This could be used by other organizations to access your [place] medical records  [id number]         Blood Pressure from Last 3 Encounters:   17 143/81    Weight from Last 3 Encounters:   17 100.2 kg (221 lb)              Today, you had the following     No orders found for display       Primary Care Provider Fax #    Provider Not In System [phone]                Equal Access to Services     [facility]: Hadii aad ku hadasho Sohamletali, waaxda luqadaha, qaybta kaalmada adeegyada, rani panchal . So [facility] [phone].    ATENCIÓN: Si habla español, tiene a adair disposición servicios gratuitos de asistencia lingüística. [place] al [phone].    We comply with applicable federal civil rights laws and [place] laws. We do not discriminate on the basis of race, color, national origin, age, disability, sex, sexual orientation, or gender identity.            Thank you!     Thank you for choosing [facility], SLEEP STUDY  for your care. Our goal is always to provide you with excellent care. Hearing back from our patients is one way we can continue to improve our services. Please take a few minutes to complete the written survey that you may receive in the mail after your visit with us. Thank you!             Your Updated Medication List - Protect others around you: Learn how to safely use, store and throw away your medicines at www.disposemymeds.org.          This list is accurate as of: 17 10:45 " AM.  Always use your most recent med list.                   Brand Name Dispense Instructions for use Diagnosis    amLODIPine 5 MG tablet    NORVASC     Take 2.5 mg by mouth daily        atenolol 100 MG tablet    TENORMIN     Take 100 mg by mouth daily        blood glucose monitoring test strip    no brand specified     Use to test blood sugar 2 times weekly or as directed.        chlorthalidone 25 MG tablet    HYGROTON     Take 25 mg by mouth daily        glipiZIDE 10 MG tablet    GLUCOTROL     Take 2 tablets p.o every morning and 2 tablets every evening to decrease blood sugar. * Take 30 minutes prior to meal        losartan 100 MG tablet    COZAAR     Take 100 mg by mouth daily        metFORMIN 500 MG 24 hr tablet    GLUCOPHAGE-XR     Take 2,000 mg by mouth daily (with dinner)        PARoxetine 40 MG tablet    PAXIL     Take 40 mg by mouth every morning        sildenafil 100 MG tablet    VIAGRA     Take 50 mg by mouth as needed for erectile dysfunction        simvastatin 20 MG tablet    ZOCOR     Take 10 mg by mouth At Bedtime        zolpidem 5 MG tablet    AMBIEN    1 tablet    Take tablet by mouth 15 minutes prior to sleep, for Sleep Study    Psychophysiological insomnia

## 2018-01-11 ENCOUNTER — THERAPY VISIT (OUTPATIENT)
Dept: SLEEP MEDICINE | Facility: CLINIC | Age: 68
End: 2018-01-11
Payer: COMMERCIAL

## 2018-01-11 DIAGNOSIS — G47.33 OBSTRUCTIVE SLEEP APNEA: ICD-10-CM

## 2018-01-11 PROCEDURE — 95810 POLYSOM 6/> YRS 4/> PARAM: CPT | Performed by: INTERNAL MEDICINE

## 2018-01-11 NOTE — MR AVS SNAPSHOT
After Visit Summary   1/11/2018    Nicholas Broderick    MRN: [id number]           Patient Information     Date Of Birth          1950        Visit Information        Provider Department      1/11/2018 8:00 PM [facility]        Today's Diagnoses     Obstructive sleep apnea          Care Instructions    [facility]    1. Your sleep study will be reviewed by a sleep physician within the next few days.     2. Please follow up in the sleep clinic as scheduled, or, make an appointment with your sleep provider to be seen within two weeks to discuss the results of the sleep study.    3. If you have any questions or problems with your treatment plan, please contact your sleep clinic provider at [phone] to further manage your condition.    4. Please review your attached medication list, and, at your follow-up appointment advise your sleep clinic provider about any changes.    5. Go to [email or web address] for more information about your sleep problems.    TASHA Raya  January 12, 2018                Follow-ups after your visit        Your next 10 appointments already scheduled     Feb 01, 2018 11:30 AM CST   Return Sleep Patient with Gareth Pham MD   [facility], Sleep Study ([facility])    [address] [place] [id number] [phone]              Who to contact     If you have questions or need follow up information about today's clinic visit or your schedule please contact [facility] directly at [phone].  Normal or non-critical lab and imaging results will be communicated to you by MyChart, letter or phone within 4 business days after the clinic has received the results. If you do not hear from us within 7 days, please contact the clinic through MyChart or phone. If you have a critical or abnormal lab result, we will notify you by phone  "as soon as possible.  Submit refill requests through [employer] or call your pharmacy and they will forward the refill request to us. Please allow 3 business days for your refill to be completed.          Additional Information About Your Visit        [employer] Health Information     [email or web address] lets you send messages to your doctor, view your test results, renew your prescriptions, schedule appointments and more. To sign up, go to [email or web address] . Click on \"Log in\" on the left side of the screen, which will take you to the Welcome page. Then click on \"Sign up Now\" on the right side of the page.     You will be asked to enter the access code listed below, as well as some personal information. Please follow the directions to create your username and password.     Your access code is: [email or web address]  Expires: 3/7/2018 11:14 AM     Your access code will  in 90 days. If you need help or a new code, please call your [place] clinic or [phone].        Care EveryWhere ID     This is your Care EveryWhere ID. This could be used by other organizations to access your [place] medical records  [id number]         Blood Pressure from Last 3 Encounters:   17 143/81    Weight from Last 3 Encounters:   17 100.2 kg (221 lb)              We Performed the Following     Comprehensive Sleep Study        Primary Care Provider Fax #    Provider Not In System [phone]                Equal Access to Services     [facility]: Hadii mickie ibarrao [place], waaxda luqadaha, qaybta kaalmada sotero, rani panchal . So [facility] [phone].    ATENCIÓN: Si habla español, tiene a adair disposición servicios gratuitos de asistencia lingüística. Llame al [phone].    We comply with applicable federal civil rights laws and [place] laws. We do not discriminate on the basis of race, color, national origin, age, disability, sex, sexual orientation, or gender identity.            Thank you!     Thank " you for choosing [facility], SLEEP STUDY  for your care. Our goal is always to provide you with excellent care. Hearing back from our patients is one way we can continue to improve our services. Please take a few minutes to complete the written survey that you may receive in the mail after your visit with us. Thank you!             Your Updated Medication List - Protect others around you: Learn how to safely use, store and throw away your medicines at www.disposemymeds.org.          This list is accurate as of: 1/11/18 11:59 PM.  Always use your most recent med list.                   Brand Name Dispense Instructions for use Diagnosis    amLODIPine 5 MG tablet    NORVASC     Take 2.5 mg by mouth daily        atenolol 100 MG tablet    TENORMIN     Take 100 mg by mouth daily        blood glucose monitoring test strip    no brand specified     Use to test blood sugar 2 times weekly or as directed.        chlorthalidone 25 MG tablet    HYGROTON     Take 25 mg by mouth daily        glipiZIDE 10 MG tablet    GLUCOTROL     Take 2 tablets p.o every morning and 2 tablets every evening to decrease blood sugar. * Take 30 minutes prior to meal        losartan 100 MG tablet    COZAAR     Take 100 mg by mouth daily        metFORMIN 500 MG 24 hr tablet    GLUCOPHAGE-XR     Take 2,000 mg by mouth daily (with dinner)        PARoxetine 40 MG tablet    PAXIL     Take 40 mg by mouth every morning        sildenafil 100 MG tablet    VIAGRA     Take 50 mg by mouth as needed for erectile dysfunction        simvastatin 20 MG tablet    ZOCOR     Take 10 mg by mouth At Bedtime        zolpidem 5 MG tablet    AMBIEN    1 tablet    Take tablet by mouth 15 minutes prior to sleep, for Sleep Study    Psychophysiological insomnia

## 2018-01-11 NOTE — Clinical Note
I did npt contact this pt with results, He has appt 2/1 with you. You saw him for his desire for CPAP alternatives to treat KRISTA.  Yamilet Hawkins M.D. Pulmonary/Critical Care/Sleep Medicine

## 2018-01-12 ENCOUNTER — DOCUMENTATION ONLY (OUTPATIENT)
Dept: SLEEP MEDICINE | Facility: CLINIC | Age: 68
End: 2018-01-12

## 2018-01-12 NOTE — PROGRESS NOTES
Watch was returned with patient at PSG.     Data was downloaded and scanned into this encounter.     Report given to lab for when provider reviews PSG.      LAURIE Nolasco  Sleep Clinic-Specialist,    Registered Medical Assistant   [facility]

## 2018-01-12 NOTE — PATIENT INSTRUCTIONS
[place] [facility]    1. Your sleep study will be reviewed by a sleep physician within the next few days.     2. Please follow up in the sleep clinic as scheduled, or, make an appointment with your sleep provider to be seen within two weeks to discuss the results of the sleep study.    3. If you have any questions or problems with your treatment plan, please contact your sleep clinic provider at [phone] to further manage your condition.    4. Please review your attached medication list, and, at your follow-up appointment advise your sleep clinic provider about any changes.    5. Go to [email or web address] for more information about your sleep problems.    Kristina Farfan, OLGAGT  January 12, 2018

## 2018-01-15 NOTE — PROCEDURES
" SLEEP STUDY INTERPRETATION  DIAGNOSTIC POLYSOMNOGRAPHY REPORT      Patient: Nicholas Broderick  YOB: 1950  Study Date: 1/11/2018  MRN: [id number]  Referring Provider: Self  Ordering Provider: Gareth Pham MD    Indications for Polysomnography: The patient is a 67 y old Male who is 5' 11\" and weighs 221.0 lbs. His BMI is 30.9, [place] sleepiness scale 5.0 and neck circumference is 42.0 cm. Relevant medical history includes hypertension, diabetes, delayed sleep phase, and history of obstructive sleep apnea with poor CPAP tolerance.  A diagnostic polysomnogram was performed to evaluate for re-evaluate for sleep apnea and alternate treatment options.    Polysomnogram Data: A full night polysomnogram recorded the standard physiologic parameters including EEG, EOG, EMG, ECG, nasal and oral airflow. Respiratory parameters of chest and abdominal movements were recorded with respiratory inductance plethysmography. Oxygen saturation was recorded by pulse oximetry.     Sleep Architecture: Markedly sleep fragmentation with poor sleep efficiency; only 134 minutes of sleep achieved with lack of N3 and REM sleep.  The total recording time of the polysomnogram was 371.8 minutes. The total sleep time was 134.0 minutes. Sleep latency was prolonged at 73.3 minutes with the use of a sleep aid (zolpidem 5 mg) REM not achieved. Arousal index was increased at 93.1 arousals per hour. Sleep efficiency was markedly decreased at 36.0%. Wake after sleep onset was 156.5 minutes. The patient spent 80.2% of total sleep time in Stage N1, 19.8% in Stage N2, 0.0% in Stage N3, and 0.0% in REM.     Respiration: Severe obstructive sleep apnea.    Events ? The polysomnogram revealed a presence of 67 obstructive, 1 central, and 0 mixed apneas resulting in an apnea index of 30.4 events per hour. There were 48 obstructive hypopneas and 0 central hypopneas resulting in an obstructive hypopnea index of 21.5 and central hypopnea index of 0 " events per hour. The combined apnea/hypopnea index was 51.9 events per hour. The supine AHI was 90.0 events per hour. The RERA index was 17.5 events per hour.  The RDI was 69.4 events per hour.    Snoring - was reported as mild with occasional snorts.    Respiratory rate and pattern - was notable for normal respiratory rate and pattern.    Sustained Sleep Associated Hypoventilation - Transcutaneous carbon dioxide monitoring was not used, however significant hypoventilation was not suggested by oximetry.    Sleep Associated Hypoxemia - (Greater than 5 minutes O2 sat at or below 88%) was not present. Baseline oxygen saturation was 96.2%. Lowest oxygen saturation was 86.1%. Time spent less than or equal to 88% was 0 minutes. Time spent less than or equal to 89% was 0.1 minutes.    Movement Activity: Frequent periodic limb movements.    Periodic Limb Activity - There were 94 PLMs during the entire study. The PLM index was 42.1 movements per hour. The PLM Arousal Index was 4.5 per hour.    REM EMG Activity - Excessive transient/sustained muscle activity could not be assessed to lack of REM sleep.    Nocturnal Behavior - Abnormal sleep related behaviors were not noted.    Bruxism - None apparent.    Cardiac Summary: Normal sinus rhythm and rates.  The average pulse rate was 60.2 bpm. The minimum pulse rate was 48.9 bpm while the maximum pulse rate was 79.2 bpm.  Arrhythmias were not noted.        Pre PSG Evaluation:     Sleep Log - not completed.    Actigraphy -   o Dates of recording - From 12/28/2017 to 1/12/2018.  o Quality -Good.  o Bed Time - Average at 1:30 AM.  o Get Up Time - Average at 11:00 AM.  o Time in Bed - Average at 11 hours.  o Total Sleep Time - Average at 7 hours.  - Minimum 4:30 hours.  - Maximum 11:20 hours.  o Sleep Onset Delay - Irregular schedule  o Sleep Periods -Interrupted by movements.  o Light exposure periods - Appropriately timed to sleep schedule  o Napping - Not noted.  Actigraphy  Interpretation: Sleep wake pattern is consistent with:  o Irregular sleep pattern with sleep phase delay    Assessment:     Severe obstructive sleep apnea with AHI 52.9 events per hour.    Markedly sleep fragmentation with poor sleep efficiency; only 134 minutes of sleep achieved with lack of N3 and REM sleep.    Frequent periodic limb movements.    Normal sinus rhythm and rates.    Irregular sleep patter with sleep phase delay    Recommendations:    Consider repeat polysomnography with full night titration with use of sleep aid of positive airway pressure therapy for the control of sleep disordered breathing if patient is interested in repeat PAP trial..    Patient may be a candidate for dental appliance through referral to Sleep Dentistry for the treatment of obstructive sleep apnea.    If devices are not acceptable or effective, patient may benefit from evaluation of possible surgical options. If he is interested, would recommend referral to specialized ENT-Sleep provider.    Advice regarding the risks of drowsy driving.    Suggest optimizing sleep schedule and avoiding sleep deprivation.    Pharmacologic therapy should be used for management of restless legs syndrome only if present and clinically indicated and not based on the presence of periodic limb movements alone.    Diagnostic Codes:   Obstructive Sleep Apnea G47.33  Periodic Limb Movement Disorder G47.61      _____________________________________   Electronically Signed By: Yamilet Hawkins MD 1/15/2018

## 2018-01-19 ENCOUNTER — DOCUMENTATION ONLY (OUTPATIENT)
Dept: SLEEP MEDICINE | Facility: CLINIC | Age: 68
End: 2018-01-19

## 2018-01-19 NOTE — PROGRESS NOTES
[facility]. Patient will follow up in sleep on 02/01/2018 for results and then continue with care though [facility].    LAURIE Nolasco  Sleep Clinic-Specialist,    Registered Medical Assistant   [facility]

## 2018-02-01 ENCOUNTER — OFFICE VISIT (OUTPATIENT)
Dept: SLEEP MEDICINE | Facility: CLINIC | Age: 68
End: 2018-02-01
Payer: COMMERCIAL

## 2018-02-01 VITALS
DIASTOLIC BLOOD PRESSURE: 84 MMHG | WEIGHT: 214 LBS | RESPIRATION RATE: 16 BRPM | HEIGHT: 71 IN | SYSTOLIC BLOOD PRESSURE: 136 MMHG | BODY MASS INDEX: 29.96 KG/M2 | OXYGEN SATURATION: 98 % | HEART RATE: 59 BPM

## 2018-02-01 DIAGNOSIS — G47.33 OBSTRUCTIVE SLEEP APNEA: Primary | ICD-10-CM

## 2018-02-01 PROCEDURE — 99204 OFFICE O/P NEW MOD 45 MIN: CPT | Performed by: INTERNAL MEDICINE

## 2018-02-01 NOTE — PATIENT INSTRUCTIONS

## 2018-02-01 NOTE — NURSING NOTE
"Chief Complaint   Patient presents with     Study Results     Follow up to discuss PSG       Initial /84  Pulse 59  Resp 16  Ht 1.803 m (5' 11\")  Wt 97.1 kg (214 lb)  SpO2 98%  BMI 29.85 kg/m2 Estimated body mass index is 29.85 kg/(m^2) as calculated from the following:    Height as of this encounter: 1.803 m (5' 11\").    Weight as of this encounter: 97.1 kg (214 lb).  Medication Reconciliation: complete     LAURIE Arnold        "

## 2018-02-01 NOTE — PROGRESS NOTES
Visit Date:   2018      Mr. Basilio is a 67-year-old gentleman with severe obstructive sleep apnea with a long history of CPAP intolerance in the setting of some degree of psychophysiologic insomnia and well-documented sleep phase delay recently confirmed by actigraphy and sleep diary.  He has interface intolerance; however, this occurs in the setting of anticipatory preoccupation with difficulty falling asleep, use of screen time in the evening, and has delayed sleep phase.  He is interested in pursuing treatment for sleep apnea and insomnia at the current time.  A sleep study was repeated which demonstrated no sleep associated hypoxemia but an Apnea-Hypopnea Index of 51 with marked sleep disruption despite 5 mg of zolpidem.      During this visit we did  him on circadian alignment, use of evening wind-down, development of a to-do list in the evening, avoidance of caffeine and alcohol, and consideration for reinitiating CPAP with sleep promoting agent.  He has a CPAP machine at home, will use 3 mg of melatonin as well as these interventions initially and if not improved, we may consider repeat study with CPAP titration with use of zolpidem for sleep initiation.  He will return here on a p.r.n. basis with the understanding he does have health risks related to his current sleep apnea, even in the absence of symptoms of sleepiness from sleep disruption and it would be more optimal for him to begin effective therapy.  We did briefly discuss neurostimulator therapy.  He is not interested in implant at the present time.  He has been considered for oral device which [facility].        Total time spent with the patient 45 minutes, greater than 50% counseling.         ROBB LINARES MD             D: 2018   T: 2018   MT: GENARO      Name:     TORI BASILIO   MRN:      [id number]        Account:      [id number]   :      1950           Visit Date:   2018       Document: [id number]

## 2018-02-01 NOTE — MR AVS SNAPSHOT
After Visit Summary   2/1/2018    Nicholas Broderick    MRN: [id number]           Patient Information     Date Of Birth          1950        Visit Information        Provider Department      2/1/2018 11:30 AM Gareth Pham MD [facility] [place], Sleep Study        Today's Diagnoses     Obstructive sleep apnea    -  1      Care Instructions      Your BMI is Body mass index is 29.85 kg/(m^2).  Weight management is a personal decision.  If you are interested in exploring weight loss strategies, the following discussion covers the approaches that may be successful. Body mass index (BMI) is one way to tell whether you are at a healthy weight, overweight, or obese. It measures your weight in relation to your height.  A BMI of 18.5 to 24.9 is in the healthy range. A person with a BMI of 25 to 29.9 is considered overweight, and someone with a BMI of 30 or greater is considered obese. More than two-thirds of American adults are considered overweight or obese.  Being overweight or obese increases the risk for further weight gain. Excess weight may lead to heart disease and diabetes.  Creating and following plans for healthy eating and physical activity may help you improve your health.  Weight control is part of healthy lifestyle and includes exercise, emotional health, and healthy eating habits. Careful eating habits lifelong are the mainstay of weight control. Though there are significant health benefits from weight loss, long-term weight loss with diet alone may be very difficult to achieve- studies show long-term success with dietary management in less than 10% of people. Attaining a healthy weight may be especially difficult to achieve in those with severe obesity. In some cases, medications, devices and surgical management might be considered.  What can you do?  If you are overweight or obese and are interested in methods for weight loss, you should discuss this with your provider.     Consider reducing  daily calorie intake by 500 calories.     Keep a food journal.     Avoiding skipping meals, consider cutting portions instead.    Diet combined with exercise helps maintain muscle while optimizing fat loss. Strength training is particularly important for building and maintaining muscle mass. Exercise helps reduce stress, increase energy, and improves fitness. Increasing exercise without diet control, however, may not burn enough calories to loose weight.       Start walking three days a week 10-20 minutes at a time    Work towards walking thirty minutes five days a week     Eventually, increase the speed of your walking for 1-2 minutes at time    In addition, we recommend that you review healthy lifestyles and methods for weight loss available through the National Institutes of Health patient information sites:  http://win.niddk.nih.gov/publications/index.htm    And look into health and wellness programs that may be available through your health insurance provider, employer, local community center, or yudith club.    Weight management plan: Patient was referred to their PCP to discuss a diet and exercise plan.              Follow-ups after your visit        Follow-up notes from your care team     Return if symptoms worsen or fail to improve.      Who to contact     If you have questions or need follow up information about today's clinic visit or your schedule please contact [facility], SLEEP STUDY directly at [phone].  Normal or non-critical lab and imaging results will be communicated to you by MyChart, letter or phone within 4 business days after the clinic has received the results. If you do not hear from us within 7 days, please contact the clinic through MyChart or phone. If you have a critical or abnormal lab result, we will notify you by phone as soon as possible.  Submit refill requests through [facility] or call your pharmacy and they will forward the refill request to us. Please allow 3 business days  "for your refill to be completed.          Additional Information About Your Visit        MyChart Information     [employer] lets you send messages to your doctor, view your test results, renew your prescriptions, schedule appointments and more. To sign up, go to [email or web address] . Click on \"Log in\" on the left side of the screen, which will take you to the Welcome page. Then click on \"Sign up Now\" on the right side of the page.     You will be asked to enter the access code listed below, as well as some personal information. Please follow the directions to create your username and password.     Your access code is: [email or web address]  Expires: 3/7/2018 11:14 AM     Your access code will  in 90 days. If you need help or a new code, please call your [place] clinic or [phone].        Care EveryWhere ID     This is your Care EveryWhere ID. This could be used by other organizations to access your [place] medical records  [id number]        Your Vitals Were     Pulse Respirations Height Pulse Oximetry BMI (Body Mass Index)       59 16 1.803 m (5' 11\") 98% 29.85 kg/m2        Blood Pressure from Last 3 Encounters:   18 136/84   17 143/81    Weight from Last 3 Encounters:   18 97.1 kg (214 lb)   17 100.2 kg (221 lb)              Today, you had the following     No orders found for display       Primary Care Provider Fax #    Provider Not In System [phone]                Equal Access to Services     : Hadii aad ku hadasho [place], waaxda luqadaha, qaybta kaalmada adeegyada, waxay eb panchal . So [facility] [phone].    ATENCIÓN: Si habla español, tiene a adair disposición servicios gratuitos de asistencia lingüística. Llame al [phone].    We comply with applicable federal civil rights laws and [place] laws. We do not discriminate on the basis of race, color, national origin, age, disability, sex, sexual orientation, or gender identity.          "   Thank you!     Thank you for choosing [facility] [place], SLEEP STUDY  for your care. Our goal is always to provide you with excellent care. Hearing back from our patients is one way we can continue to improve our services. Please take a few minutes to complete the written survey that you may receive in the mail after your visit with us. Thank you!             Your Updated Medication List - Protect others around you: Learn how to safely use, store and throw away your medicines at www.disposemymeds.org.          This list is accurate as of 2/1/18  4:33 PM.  Always use your most recent med list.                   Brand Name Dispense Instructions for use Diagnosis    amLODIPine 5 MG tablet    NORVASC     Take 2.5 mg by mouth daily        atenolol 100 MG tablet    TENORMIN     Take 100 mg by mouth daily        blood glucose monitoring test strip    no brand specified     Use to test blood sugar 2 times weekly or as directed.        chlorthalidone 25 MG tablet    HYGROTON     Take 25 mg by mouth daily        glipiZIDE 10 MG tablet    GLUCOTROL     Take 2 tablets p.o every morning and 2 tablets every evening to decrease blood sugar. * Take 30 minutes prior to meal        losartan 100 MG tablet    COZAAR     Take 100 mg by mouth daily        metFORMIN 500 MG 24 hr tablet    GLUCOPHAGE-XR     Take 2,000 mg by mouth daily (with dinner)        PARoxetine 40 MG tablet    PAXIL     Take 40 mg by mouth every morning        sildenafil 100 MG tablet    VIAGRA     Take 50 mg by mouth as needed for erectile dysfunction        simvastatin 20 MG tablet    ZOCOR     Take 10 mg by mouth At Bedtime

## 2019-03-12 ENCOUNTER — HOSPITAL ENCOUNTER (OUTPATIENT)
Dept: CT IMAGING | Facility: CLINIC | Age: 69
Discharge: HOME | End: 2019-03-12
Attending: INTERNAL MEDICINE | Admitting: INTERNAL MEDICINE
Payer: COMMERCIAL

## 2019-03-12 VITALS — DIASTOLIC BLOOD PRESSURE: 85 MMHG | SYSTOLIC BLOOD PRESSURE: 153 MMHG

## 2019-03-12 DIAGNOSIS — T73.3XXA: ICD-10-CM

## 2019-03-12 LAB
CREAT BLD-MCNC: 1.4 MG/DL (ref 0.66–1.25)
GFR SERPL CREATININE-BSD FRML MDRD: 50 ML/MIN/{1.73_M2}

## 2019-03-12 PROCEDURE — 0504T CT FFR: CPT | Performed by: INTERNAL MEDICINE

## 2019-03-12 PROCEDURE — 75574 CT ANGIO HRT W/3D IMAGE: CPT

## 2019-03-12 PROCEDURE — 75574 CT ANGIO HRT W/3D IMAGE: CPT | Mod: 26 | Performed by: INTERNAL MEDICINE

## 2019-03-12 PROCEDURE — 25000132 ZZH RX MED GY IP 250 OP 250 PS 637: Performed by: INTERNAL MEDICINE

## 2019-03-12 PROCEDURE — 25000128 H RX IP 250 OP 636: Performed by: INTERNAL MEDICINE

## 2019-03-12 PROCEDURE — 82565 ASSAY OF CREATININE: CPT

## 2019-03-12 PROCEDURE — 0503T CT FFR: CPT

## 2019-03-12 RX ORDER — IOPAMIDOL 755 MG/ML
120 INJECTION, SOLUTION INTRAVASCULAR ONCE
Status: COMPLETED | OUTPATIENT
Start: 2019-03-12 | End: 2019-03-12

## 2019-03-12 RX ORDER — NITROGLYCERIN 0.4 MG/1
.4-.8 TABLET SUBLINGUAL
Status: DISCONTINUED | OUTPATIENT
Start: 2019-03-12 | End: 2019-03-13 | Stop reason: HOSPADM

## 2019-03-12 RX ORDER — ACYCLOVIR 200 MG/1
0-1 CAPSULE ORAL
Status: DISCONTINUED | OUTPATIENT
Start: 2019-03-12 | End: 2019-03-13 | Stop reason: HOSPADM

## 2019-03-12 RX ORDER — METOPROLOL TARTRATE 50 MG
50-100 TABLET ORAL
Status: COMPLETED | OUTPATIENT
Start: 2019-03-12 | End: 2019-03-12

## 2019-03-12 RX ORDER — METOPROLOL TARTRATE 1 MG/ML
5-15 INJECTION, SOLUTION INTRAVENOUS
Status: DISCONTINUED | OUTPATIENT
Start: 2019-03-12 | End: 2019-03-13 | Stop reason: HOSPADM

## 2019-03-12 RX ADMIN — IOPAMIDOL 120 ML: 755 INJECTION, SOLUTION INTRAVENOUS at 11:19

## 2019-03-12 RX ADMIN — METOPROLOL TARTRATE 100 MG: 100 TABLET, FILM COATED ORAL at 10:07

## 2019-03-12 RX ADMIN — NITROGLYCERIN 0.8 MG: 0.4 TABLET SUBLINGUAL at 11:33

## 2019-03-12 NOTE — PROGRESS NOTES
Pt arrived for CTA. Test, meds and side effects reviewed with pt. Resting HR greater than 70 bpm. Given 100 mg PO Metoprolol per verbal order.Administered 0.8 mg SL nitro on CTA table per order. CTA completed; tolerated procedure well. Post monitoring completed and VSS. D/C instructions reviewed with pt who verbalized understanding of need to increase PO fluids today. D/C to gold waiting room accompanied by staff.

## 2019-04-22 ENCOUNTER — HOSPITAL ENCOUNTER (EMERGENCY)
Age: 69
Discharge: ANOTHER HOSPITAL | End: 2019-04-23
Attending: EMERGENCY MEDICINE
Payer: MEDICAID

## 2019-04-22 ENCOUNTER — APPOINTMENT (OUTPATIENT)
Dept: GENERAL RADIOLOGY | Age: 69
End: 2019-04-22
Payer: MEDICAID

## 2019-04-22 DIAGNOSIS — J93.9 PNEUMOTHORAX ON RIGHT: ICD-10-CM

## 2019-04-22 DIAGNOSIS — S22.42XA CLOSED FRACTURE OF MULTIPLE RIBS OF LEFT SIDE, INITIAL ENCOUNTER: Primary | ICD-10-CM

## 2019-04-22 LAB
A/G RATIO: 1.2 (ref 1.1–2.2)
ALBUMIN SERPL-MCNC: 4.5 G/DL (ref 3.4–5)
ALP BLD-CCNC: 63 U/L (ref 40–129)
ALT SERPL-CCNC: 25 U/L (ref 10–40)
ANION GAP SERPL CALCULATED.3IONS-SCNC: 13 MMOL/L (ref 3–16)
AST SERPL-CCNC: 46 U/L (ref 15–37)
BASE EXCESS VENOUS: 0 (ref -3–3)
BILIRUB SERPL-MCNC: 0.4 MG/DL (ref 0–1)
BUN BLDV-MCNC: 20 MG/DL (ref 7–20)
CALCIUM SERPL-MCNC: 9.7 MG/DL (ref 8.3–10.6)
CHLORIDE BLD-SCNC: 98 MMOL/L (ref 99–110)
CO2: 26 MMOL/L (ref 21–32)
CREAT SERPL-MCNC: 1.2 MG/DL (ref 0.8–1.3)
ETHANOL: NORMAL MG/DL (ref 0–0.08)
GFR AFRICAN AMERICAN: >60
GFR NON-AFRICAN AMERICAN: 60
GLOBULIN: 3.7 G/DL
GLUCOSE BLD-MCNC: 139 MG/DL (ref 70–99)
HCO3 VENOUS: 26.5 MMOL/L (ref 23–29)
HCT VFR BLD CALC: 44.2 % (ref 40.5–52.5)
HEMOGLOBIN: 14.8 G/DL (ref 13.5–17.5)
LACTATE: 1 MMOL/L (ref 0.4–2)
MCH RBC QN AUTO: 32.9 PG (ref 26–34)
MCHC RBC AUTO-ENTMCNC: 33.4 G/DL (ref 31–36)
MCV RBC AUTO: 98.7 FL (ref 80–100)
O2 SAT, VEN: 72 %
PCO2, VEN: 54.1 MM HG (ref 40–50)
PDW BLD-RTO: 14.1 % (ref 12.4–15.4)
PERFORMED ON: ABNORMAL
PH VENOUS: 7.3 (ref 7.35–7.45)
PLATELET # BLD: 137 K/UL (ref 135–450)
PMV BLD AUTO: 9.3 FL (ref 5–10.5)
PO2, VEN: 43 MM HG
POC SAMPLE TYPE: ABNORMAL
POTASSIUM REFLEX MAGNESIUM: 4.3 MMOL/L (ref 3.5–5.1)
RBC # BLD: 4.48 M/UL (ref 4.2–5.9)
SODIUM BLD-SCNC: 137 MMOL/L (ref 136–145)
TCO2 CALC VENOUS: 28 MMOL/L
TOTAL PROTEIN: 8.2 G/DL (ref 6.4–8.2)
WBC # BLD: 10 K/UL (ref 4–11)

## 2019-04-22 PROCEDURE — 80053 COMPREHEN METABOLIC PANEL: CPT

## 2019-04-22 PROCEDURE — 93005 ELECTROCARDIOGRAM TRACING: CPT | Performed by: EMERGENCY MEDICINE

## 2019-04-22 PROCEDURE — 6360000002 HC RX W HCPCS: Performed by: EMERGENCY MEDICINE

## 2019-04-22 PROCEDURE — 96374 THER/PROPH/DIAG INJ IV PUSH: CPT

## 2019-04-22 PROCEDURE — 71045 X-RAY EXAM CHEST 1 VIEW: CPT

## 2019-04-22 PROCEDURE — 99291 CRITICAL CARE FIRST HOUR: CPT

## 2019-04-22 PROCEDURE — 4500000026 HC ED CRITICAL CARE PROCEDURE

## 2019-04-22 PROCEDURE — 82803 BLOOD GASES ANY COMBINATION: CPT

## 2019-04-22 PROCEDURE — G0480 DRUG TEST DEF 1-7 CLASSES: HCPCS

## 2019-04-22 PROCEDURE — 83605 ASSAY OF LACTIC ACID: CPT

## 2019-04-22 PROCEDURE — 85027 COMPLETE CBC AUTOMATED: CPT

## 2019-04-22 RX ORDER — LIDOCAINE HYDROCHLORIDE AND EPINEPHRINE 10; 10 MG/ML; UG/ML
INJECTION, SOLUTION INFILTRATION; PERINEURAL
Status: COMPLETED
Start: 2019-04-22 | End: 2019-04-23

## 2019-04-22 RX ORDER — FENTANYL CITRATE 50 UG/ML
50 INJECTION, SOLUTION INTRAMUSCULAR; INTRAVENOUS ONCE
Status: COMPLETED | OUTPATIENT
Start: 2019-04-22 | End: 2019-04-22

## 2019-04-22 RX ORDER — KETAMINE HCL 50MG/ML(1)
SYRINGE (ML) INTRAVENOUS
Status: COMPLETED
Start: 2019-04-22 | End: 2019-04-23

## 2019-04-22 RX ADMIN — FENTANYL CITRATE 50 MCG: 50 INJECTION INTRAMUSCULAR; INTRAVENOUS at 23:02

## 2019-04-22 ASSESSMENT — PAIN SCALES - GENERAL: PAINLEVEL_OUTOF10: 10

## 2019-04-23 ENCOUNTER — APPOINTMENT (OUTPATIENT)
Dept: GENERAL RADIOLOGY | Age: 69
End: 2019-04-23
Payer: MEDICAID

## 2019-04-23 ENCOUNTER — APPOINTMENT (OUTPATIENT)
Dept: CT IMAGING | Age: 69
End: 2019-04-23
Payer: MEDICAID

## 2019-04-23 VITALS
DIASTOLIC BLOOD PRESSURE: 87 MMHG | OXYGEN SATURATION: 98 % | BODY MASS INDEX: 22.48 KG/M2 | TEMPERATURE: 97.7 F | HEIGHT: 69 IN | HEART RATE: 60 BPM | WEIGHT: 151.8 LBS | RESPIRATION RATE: 18 BRPM | SYSTOLIC BLOOD PRESSURE: 138 MMHG

## 2019-04-23 LAB
EKG ATRIAL RATE: 87 BPM
EKG DIAGNOSIS: NORMAL
EKG P AXIS: 100 DEGREES
EKG P-R INTERVAL: 170 MS
EKG Q-T INTERVAL: 372 MS
EKG QRS DURATION: 74 MS
EKG QTC CALCULATION (BAZETT): 447 MS
EKG R AXIS: 81 DEGREES
EKG T AXIS: 85 DEGREES
EKG VENTRICULAR RATE: 87 BPM

## 2019-04-23 PROCEDURE — 71260 CT THORAX DX C+: CPT

## 2019-04-23 PROCEDURE — 96375 TX/PRO/DX INJ NEW DRUG ADDON: CPT

## 2019-04-23 PROCEDURE — 71045 X-RAY EXAM CHEST 1 VIEW: CPT

## 2019-04-23 PROCEDURE — 6360000002 HC RX W HCPCS

## 2019-04-23 PROCEDURE — 6360000004 HC RX CONTRAST MEDICATION: Performed by: EMERGENCY MEDICINE

## 2019-04-23 PROCEDURE — 2500000003 HC RX 250 WO HCPCS

## 2019-04-23 RX ORDER — MORPHINE SULFATE 4 MG/ML
4 INJECTION, SOLUTION INTRAMUSCULAR; INTRAVENOUS ONCE
Status: COMPLETED | OUTPATIENT
Start: 2019-04-23 | End: 2019-04-23

## 2019-04-23 RX ORDER — MORPHINE SULFATE 4 MG/ML
INJECTION, SOLUTION INTRAMUSCULAR; INTRAVENOUS
Status: COMPLETED
Start: 2019-04-23 | End: 2019-04-23

## 2019-04-23 RX ADMIN — MORPHINE SULFATE 4 MG: 4 INJECTION, SOLUTION INTRAMUSCULAR; INTRAVENOUS at 00:59

## 2019-04-23 RX ADMIN — LIDOCAINE HYDROCHLORIDE,EPINEPHRINE BITARTRATE: 10; .01 INJECTION, SOLUTION INFILTRATION; PERINEURAL at 00:53

## 2019-04-23 RX ADMIN — Medication 15 MG: at 00:23

## 2019-04-23 RX ADMIN — IOPAMIDOL 80 ML: 755 INJECTION, SOLUTION INTRAVENOUS at 01:55

## 2019-04-23 ASSESSMENT — PAIN SCALES - GENERAL
PAINLEVEL_OUTOF10: 10

## 2019-04-23 NOTE — ED NOTES
Bed: A10-10  Expected date:   Expected time:   Means of arrival:   Comments:  Medic  [address], RN  04/22/19 9473
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
## 2019-04-23 NOTE — ED NOTES
mobile care at bedside/pt report given/pt transported via squad to [facility].      Christina Galeas RN  04/23/19 2884

## 2019-04-23 NOTE — ED NOTES
Provided patient patient a warm blanket and turned off the lights for patient comfort.      Bushra Tong RN  04/23/19 7350

## 2019-04-23 NOTE — ED PROVIDER NOTES
Chest Tube  Date/Time: 4/23/2019 12:44 AM  Performed by: Romana Myers MD  Authorized by: Germaine Hayden MD     Consent:     Consent obtained:  Written    Consent given by:  Patient    Risks discussed:  Bleeding, incomplete drainage, infection, pain, nerve damage and damage to surrounding structures  Pre-procedure details:     Skin preparation:  Betadine    Preparation: Patient was prepped and draped in the usual sterile fashion    Sedation:     Sedation type: Anxiolysis  Anesthesia (see MAR for exact dosages): Anesthesia method:  Local infiltration    Local anesthetic:  Lidocaine 1% WITH epi  Procedure details:     Placement location:  R lateral    Scalpel size:  10    Tube size (Fr):  32    Dissection instrument:  Finger and Eliza clamp    Ultrasound guidance: no      Tension pneumothorax: no      Tube connected to:  Suction    Drainage characteristics:  Air only    Suture material:  2-0 silk    Dressing:  Petrolatum-impregnated gauze and 4x4 sterile gauze  Post-procedure details:     Post-insertion x-ray findings: tube in good position      Patient tolerance of procedure:   Tolerated well, no immediate complications  Comments:      Could be pulled back 1-2 cm, but overall in the right location            Romana Myers MD  Resident  04/23/19 0230
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
## 2019-05-03 ENCOUNTER — APPOINTMENT (OUTPATIENT)
Dept: GENERAL RADIOLOGY | Age: 69
End: 2019-05-03
Payer: MEDICAID

## 2019-05-03 ENCOUNTER — HOSPITAL ENCOUNTER (EMERGENCY)
Age: 69
Discharge: HOME | End: 2019-05-03
Attending: EMERGENCY MEDICINE
Payer: MEDICAID

## 2019-05-03 ENCOUNTER — APPOINTMENT (OUTPATIENT)
Dept: CT IMAGING | Age: 69
End: 2019-05-03
Payer: MEDICAID

## 2019-05-03 VITALS
TEMPERATURE: 98.1 F | DIASTOLIC BLOOD PRESSURE: 78 MMHG | SYSTOLIC BLOOD PRESSURE: 141 MMHG | HEART RATE: 74 BPM | OXYGEN SATURATION: 100 % | RESPIRATION RATE: 16 BRPM

## 2019-05-03 DIAGNOSIS — R06.00 DYSPNEA, UNSPECIFIED TYPE: Primary | ICD-10-CM

## 2019-05-03 LAB
ANION GAP SERPL CALCULATED.3IONS-SCNC: 14 MMOL/L (ref 3–16)
BASOPHILS ABSOLUTE: 0 K/UL (ref 0–0.2)
BASOPHILS RELATIVE PERCENT: 0.9 %
BUN BLDV-MCNC: 21 MG/DL (ref 7–20)
CALCIUM SERPL-MCNC: 9.6 MG/DL (ref 8.3–10.6)
CHLORIDE BLD-SCNC: 99 MMOL/L (ref 99–110)
CO2: 23 MMOL/L (ref 21–32)
CREAT SERPL-MCNC: 0.9 MG/DL (ref 0.8–1.3)
EKG ATRIAL RATE: 71 BPM
EKG DIAGNOSIS: NORMAL
EKG P AXIS: 72 DEGREES
EKG P-R INTERVAL: 200 MS
EKG Q-T INTERVAL: 400 MS
EKG QRS DURATION: 84 MS
EKG QTC CALCULATION (BAZETT): 434 MS
EKG R AXIS: 22 DEGREES
EKG T AXIS: 68 DEGREES
EKG VENTRICULAR RATE: 71 BPM
EOSINOPHILS ABSOLUTE: 0.1 K/UL (ref 0–0.6)
EOSINOPHILS RELATIVE PERCENT: 2.1 %
GFR AFRICAN AMERICAN: >60
GFR NON-AFRICAN AMERICAN: >60
GLUCOSE BLD-MCNC: 84 MG/DL (ref 70–99)
HCT VFR BLD CALC: 40.9 % (ref 40.5–52.5)
HEMOGLOBIN: 13.7 G/DL (ref 13.5–17.5)
LYMPHOCYTES ABSOLUTE: 2 K/UL (ref 1–5.1)
LYMPHOCYTES RELATIVE PERCENT: 37.1 %
MCH RBC QN AUTO: 32.7 PG (ref 26–34)
MCHC RBC AUTO-ENTMCNC: 33.4 G/DL (ref 31–36)
MCV RBC AUTO: 97.9 FL (ref 80–100)
MONOCYTES ABSOLUTE: 0.9 K/UL (ref 0–1.3)
MONOCYTES RELATIVE PERCENT: 16.9 %
NEUTROPHILS ABSOLUTE: 2.3 K/UL (ref 1.7–7.7)
NEUTROPHILS RELATIVE PERCENT: 43 %
PDW BLD-RTO: 13.9 % (ref 12.4–15.4)
PLATELET # BLD: 221 K/UL (ref 135–450)
PMV BLD AUTO: 8.4 FL (ref 5–10.5)
POTASSIUM SERPL-SCNC: 4.3 MMOL/L (ref 3.5–5.1)
PRO-BNP: 22 PG/ML (ref 0–124)
RBC # BLD: 4.18 M/UL (ref 4.2–5.9)
SODIUM BLD-SCNC: 136 MMOL/L (ref 136–145)
TROPONIN: <0.01 NG/ML
WBC # BLD: 5.4 K/UL (ref 4–11)

## 2019-05-03 PROCEDURE — 99285 EMERGENCY DEPT VISIT HI MDM: CPT

## 2019-05-03 PROCEDURE — 84484 ASSAY OF TROPONIN QUANT: CPT

## 2019-05-03 PROCEDURE — 83880 ASSAY OF NATRIURETIC PEPTIDE: CPT

## 2019-05-03 PROCEDURE — 85025 COMPLETE CBC W/AUTO DIFF WBC: CPT

## 2019-05-03 PROCEDURE — 93005 ELECTROCARDIOGRAM TRACING: CPT | Performed by: EMERGENCY MEDICINE

## 2019-05-03 PROCEDURE — 80048 BASIC METABOLIC PNL TOTAL CA: CPT

## 2019-05-03 PROCEDURE — 71046 X-RAY EXAM CHEST 2 VIEWS: CPT

## 2019-05-03 PROCEDURE — 6370000000 HC RX 637 (ALT 250 FOR IP): Performed by: EMERGENCY MEDICINE

## 2019-05-03 RX ORDER — LIDOCAINE 4 G/G
1 PATCH TOPICAL DAILY
Status: DISCONTINUED | OUTPATIENT
Start: 2019-05-03 | End: 2019-05-03 | Stop reason: HOSPADM

## 2019-05-03 RX ORDER — LIDOCAINE 50 MG/G
1 PATCH TOPICAL DAILY
Qty: 30 PATCH | Refills: 0 | Status: SHIPPED | OUTPATIENT
Start: 2019-05-03 | End: 2020-07-28 | Stop reason: ALTCHOICE

## 2019-05-03 RX ORDER — AMOXICILLIN AND CLAVULANATE POTASSIUM 875; 125 MG/1; MG/1
1 TABLET, FILM COATED ORAL
COMMUNITY
Start: 2019-04-28 | End: 2019-05-04

## 2019-05-03 ASSESSMENT — PAIN SCALES - GENERAL: PAINLEVEL_OUTOF10: 5

## 2019-05-03 ASSESSMENT — PAIN DESCRIPTION - ORIENTATION: ORIENTATION: RIGHT

## 2019-05-03 ASSESSMENT — PAIN DESCRIPTION - LOCATION: LOCATION: RIB CAGE
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
## 2020-03-15 ENCOUNTER — APPOINTMENT (OUTPATIENT)
Dept: GENERAL RADIOLOGY | Age: 70
End: 2020-03-15
Payer: MEDICAID

## 2020-03-15 ENCOUNTER — HOSPITAL ENCOUNTER (EMERGENCY)
Age: 70
Discharge: HOME | End: 2020-03-15
Attending: EMERGENCY MEDICINE
Payer: MEDICAID

## 2020-03-15 VITALS
TEMPERATURE: 97.7 F | WEIGHT: 157 LBS | BODY MASS INDEX: 23.25 KG/M2 | HEIGHT: 69 IN | OXYGEN SATURATION: 90 % | SYSTOLIC BLOOD PRESSURE: 133 MMHG | HEART RATE: 90 BPM | DIASTOLIC BLOOD PRESSURE: 87 MMHG | RESPIRATION RATE: 22 BRPM

## 2020-03-15 LAB
ANION GAP SERPL CALCULATED.3IONS-SCNC: 11 MMOL/L (ref 3–16)
BASE EXCESS VENOUS: 1.8 MMOL/L (ref -2–3)
BASOPHILS ABSOLUTE: 0 K/UL (ref 0–0.2)
BASOPHILS RELATIVE PERCENT: 0.7 %
BUN BLDV-MCNC: 17 MG/DL (ref 7–20)
CALCIUM SERPL-MCNC: 9.7 MG/DL (ref 8.3–10.6)
CARBOXYHEMOGLOBIN: 3.2 % (ref 0–1.5)
CHLORIDE BLD-SCNC: 100 MMOL/L (ref 99–110)
CO2: 27 MMOL/L (ref 21–32)
CREAT SERPL-MCNC: 0.9 MG/DL (ref 0.8–1.3)
EOSINOPHILS ABSOLUTE: 0.2 K/UL (ref 0–0.6)
EOSINOPHILS RELATIVE PERCENT: 3.5 %
GFR AFRICAN AMERICAN: >60
GFR NON-AFRICAN AMERICAN: >60
GLUCOSE BLD-MCNC: 99 MG/DL (ref 70–99)
HCO3 VENOUS: 28.3 MMOL/L (ref 24–28)
HCT VFR BLD CALC: 43.7 % (ref 40.5–52.5)
HEMOGLOBIN, VEN, REDUCED: 6.3 %
HEMOGLOBIN: 14.6 G/DL (ref 13.5–17.5)
LYMPHOCYTES ABSOLUTE: 3.2 K/UL (ref 1–5.1)
LYMPHOCYTES RELATIVE PERCENT: 57.2 %
MCH RBC QN AUTO: 32.7 PG (ref 26–34)
MCHC RBC AUTO-ENTMCNC: 33.4 G/DL (ref 31–36)
MCV RBC AUTO: 98.1 FL (ref 80–100)
METHEMOGLOBIN VENOUS: 0.5 % (ref 0–1.5)
MONOCYTES ABSOLUTE: 0.8 K/UL (ref 0–1.3)
MONOCYTES RELATIVE PERCENT: 14.2 %
NEUTROPHILS ABSOLUTE: 1.4 K/UL (ref 1.7–7.7)
NEUTROPHILS RELATIVE PERCENT: 24.4 %
O2 SAT, VEN: 94 %
PCO2, VEN: 53.9 MMHG (ref 41–51)
PDW BLD-RTO: 13.1 % (ref 12.4–15.4)
PH VENOUS: 7.34 (ref 7.35–7.45)
PLATELET # BLD: 147 K/UL (ref 135–450)
PMV BLD AUTO: 9.3 FL (ref 5–10.5)
PO2, VEN: 70.1 MMHG (ref 25–40)
POTASSIUM SERPL-SCNC: 4.1 MMOL/L (ref 3.5–5.1)
PRO-BNP: 27 PG/ML (ref 0–124)
RBC # BLD: 4.45 M/UL (ref 4.2–5.9)
SODIUM BLD-SCNC: 138 MMOL/L (ref 136–145)
TCO2 CALC VENOUS: 30 MMOL/L
TROPONIN: <0.01 NG/ML
WBC # BLD: 5.6 K/UL (ref 4–11)

## 2020-03-15 PROCEDURE — 84484 ASSAY OF TROPONIN QUANT: CPT

## 2020-03-15 PROCEDURE — 93005 ELECTROCARDIOGRAM TRACING: CPT | Performed by: EMERGENCY MEDICINE

## 2020-03-15 PROCEDURE — 99285 EMERGENCY DEPT VISIT HI MDM: CPT

## 2020-03-15 PROCEDURE — 85025 COMPLETE CBC W/AUTO DIFF WBC: CPT

## 2020-03-15 PROCEDURE — 82803 BLOOD GASES ANY COMBINATION: CPT

## 2020-03-15 PROCEDURE — 6360000002 HC RX W HCPCS: Performed by: EMERGENCY MEDICINE

## 2020-03-15 PROCEDURE — 71046 X-RAY EXAM CHEST 2 VIEWS: CPT

## 2020-03-15 PROCEDURE — 83880 ASSAY OF NATRIURETIC PEPTIDE: CPT

## 2020-03-15 PROCEDURE — 94640 AIRWAY INHALATION TREATMENT: CPT

## 2020-03-15 PROCEDURE — 6370000000 HC RX 637 (ALT 250 FOR IP): Performed by: EMERGENCY MEDICINE

## 2020-03-15 PROCEDURE — 80048 BASIC METABOLIC PNL TOTAL CA: CPT

## 2020-03-15 RX ORDER — IPRATROPIUM BROMIDE AND ALBUTEROL SULFATE 2.5; .5 MG/3ML; MG/3ML
1 SOLUTION RESPIRATORY (INHALATION) ONCE
Status: COMPLETED | OUTPATIENT
Start: 2020-03-15 | End: 2020-03-15

## 2020-03-15 RX ORDER — BUDESONIDE AND FORMOTEROL FUMARATE DIHYDRATE 80; 4.5 UG/1; UG/1
2 AEROSOL RESPIRATORY (INHALATION) 2 TIMES DAILY
Qty: 1 INHALER | Refills: 3 | Status: SHIPPED | OUTPATIENT
Start: 2020-03-15 | End: 2020-07-28 | Stop reason: SDUPTHER

## 2020-03-15 RX ORDER — ALBUTEROL SULFATE 2.5 MG/3ML
2.5 SOLUTION RESPIRATORY (INHALATION) ONCE
Status: COMPLETED | OUTPATIENT
Start: 2020-03-15 | End: 2020-03-15

## 2020-03-15 RX ORDER — ALBUTEROL SULFATE 90 UG/1
2 AEROSOL, METERED RESPIRATORY (INHALATION) EVERY 4 HOURS PRN
Qty: 1 INHALER | Refills: 0 | Status: SHIPPED | OUTPATIENT
Start: 2020-03-15

## 2020-03-15 RX ORDER — PREDNISONE 20 MG/1
40 TABLET ORAL DAILY
Qty: 10 TABLET | Refills: 0 | Status: SHIPPED | OUTPATIENT
Start: 2020-03-15 | End: 2020-03-20

## 2020-03-15 RX ADMIN — IPRATROPIUM BROMIDE AND ALBUTEROL SULFATE 1 AMPULE: .5; 3 SOLUTION RESPIRATORY (INHALATION) at 21:20

## 2020-03-15 RX ADMIN — ALBUTEROL SULFATE 2.5 MG: 2.5 SOLUTION RESPIRATORY (INHALATION) at 21:20

## 2020-03-16 LAB
EKG ATRIAL RATE: 70 BPM
EKG DIAGNOSIS: NORMAL
EKG P AXIS: 73 DEGREES
EKG P-R INTERVAL: 220 MS
EKG Q-T INTERVAL: 398 MS
EKG QRS DURATION: 86 MS
EKG QTC CALCULATION (BAZETT): 429 MS
EKG R AXIS: 69 DEGREES
EKG T AXIS: 81 DEGREES
EKG VENTRICULAR RATE: 70 BPM

## 2020-03-16 ASSESSMENT — ENCOUNTER SYMPTOMS
GASTROINTESTINAL NEGATIVE: 1
SHORTNESS OF BREATH: 1
EYES NEGATIVE: 1
COUGH: 0

## 2020-03-16 NOTE — ED PROVIDER NOTES
[address] [facility]          ATTENDING PHYSICIAN NOTE       Date of evaluation: 3/15/2020    Chief Complaint     Shortness of Breath (Increased SOB x 3 days. HX COPD. No cough or fever )      History of Present Illness     Loiuse Jo is a 69 y.o. male who presents to the emergency department complaining of shortness of breath. Patient does have a history of COPD and states he has been having increasing shortness of breath for the last several days. He states he is out of his inhalers because he had a disagreement with his primary care provider and was fired from the practice. Patient denies any fevers or chills. He denies any cough. He denies any swelling or pain in his extremities. He denies any chest pain. He has been trying to use over-the-counter Vicks vapor rub without improvement of his symptoms. Review of Systems     Review of Systems   Constitutional: Negative. HENT: Negative. Eyes: Negative. Respiratory: Positive for shortness of breath. Negative for cough. Cardiovascular: Negative. Gastrointestinal: Negative. Genitourinary: Negative. Musculoskeletal: Negative. Neurological: Negative. All other systems reviewed and are negative. Past Medical, Surgical, Family, and Social History     He has a past medical history of COPD (chronic obstructive pulmonary disease) ([address] Hepatitis C, Hypertension, and Noncompliance with medication regimen. He has no past surgical history on file. His family history is not on file. He reports that he has been smoking cigarettes. He has never used smokeless tobacco. He reports that he does not drink alcohol or use drugs.     Medications     Discharge Medication List as of 3/15/2020 11:01 PM      CONTINUE these medications which have NOT CHANGED    Details   lidocaine (LIDODERM) 5 % Place 1 patch onto the skin daily 12 hours on, 12 hours off., Disp-30 patch, R-0Print      ibuprofen (IBU) 800 MG ipratropium-albuterol (DUONEB) nebulizer solution 1 ampule    albuterol (PROVENTIL) nebulizer solution 2.5 mg    albuterol (PROVENTIL) nebulizer solution 2.5 mg    albuterol sulfate HFA (PROVENTIL HFA) 108 (90 Base) MCG/ACT inhaler     Sig: Inhale 2 puffs into the lungs every 4 hours as needed for Wheezing or Shortness of Breath (Space out to every 6 hours as symptoms improve) Space out to every 6 hours as symptoms improve. Dispense:  1 Inhaler     Refill:  0    budesonide-formoterol (SYMBICORT) 80-4.5 MCG/ACT AERO     Sig: Inhale 2 puffs into the lungs 2 times daily     Dispense:  1 Inhaler     Refill:  3    predniSONE (DELTASONE) 20 MG tablet     Sig: Take 2 tablets by mouth daily for 5 days     Dispense:  10 tablet     Refill:  0       CONSULTS:  None    MEDICAL DECISIONMAKING / ASSESSMENT / Wilfredo Tello is a 69 y.o. male who presents to the emergency department complaining of shortness of breath. Patient states he has a history of COPD and this feels the same. Patient does have diffuse wheezing present bilaterally. Chest x-ray shows no acute airspace disease. Laboratory studies are unremarkable. Patient was given nebulizer treatments with improvement of his symptoms. I have low suspicion for an infectious source so do not feel patient needs additional testing. Patient will be given a burst of prednisone, refills of his inhalers, and will be instructed with establish a new primary care provider. Clinical Impression     1. COPD exacerbation ([facility] [address] [facility]        Disposition     PATIENT REFERRED TO:  Primary care provider of your choice.             DISCHARGE MEDICATIONS:  Discharge Medication List as of 3/15/2020 11:01 PM      START taking these medications    Details   predniSONE (DELTASONE) 20 MG tablet Take 2 tablets by mouth daily for 5 days, Disp-10 tablet, R-0Print             DISPOSITION Decision To Discharge 03/15/2020 10:59:47 PM        Ingrid Dunne MD  03/16/20 0225

## 2020-07-14 ENCOUNTER — TELEPHONE (OUTPATIENT)
Dept: PRIMARY CARE CLINIC | Age: 70
End: 2020-07-14

## 2020-07-28 ENCOUNTER — OFFICE VISIT (OUTPATIENT)
Dept: PRIMARY CARE CLINIC | Age: 70
End: 2020-07-28
Payer: MEDICAID

## 2020-07-28 VITALS
DIASTOLIC BLOOD PRESSURE: 82 MMHG | OXYGEN SATURATION: 93 % | SYSTOLIC BLOOD PRESSURE: 142 MMHG | WEIGHT: 170 LBS | HEIGHT: 68 IN | TEMPERATURE: 98.7 F | HEART RATE: 90 BPM | BODY MASS INDEX: 25.76 KG/M2

## 2020-07-28 PROBLEM — N40.1 BPH WITH OBSTRUCTION/LOWER URINARY TRACT SYMPTOMS: Status: ACTIVE | Noted: 2017-03-20

## 2020-07-28 PROBLEM — N13.8 BPH WITH OBSTRUCTION/LOWER URINARY TRACT SYMPTOMS: Status: ACTIVE | Noted: 2017-03-20

## 2020-07-28 PROBLEM — Z86.19 HISTORY OF HEPATITIS C: Status: ACTIVE | Noted: 2020-07-28

## 2020-07-28 PROBLEM — J44.9 COPD (CHRONIC OBSTRUCTIVE PULMONARY DISEASE) (HCC): Status: ACTIVE | Noted: 2019-06-21

## 2020-07-28 PROBLEM — N40.0 BENIGN PROSTATIC HYPERPLASIA: Status: ACTIVE | Noted: 2017-03-20

## 2020-07-28 PROCEDURE — 3017F COLORECTAL CA SCREEN DOC REV: CPT | Performed by: FAMILY MEDICINE

## 2020-07-28 PROCEDURE — G8417 CALC BMI ABV UP PARAM F/U: HCPCS | Performed by: FAMILY MEDICINE

## 2020-07-28 PROCEDURE — 99203 OFFICE O/P NEW LOW 30 MIN: CPT | Performed by: FAMILY MEDICINE

## 2020-07-28 PROCEDURE — G8926 SPIRO NO PERF OR DOC: HCPCS | Performed by: FAMILY MEDICINE

## 2020-07-28 PROCEDURE — G8427 DOCREV CUR MEDS BY ELIG CLIN: HCPCS | Performed by: FAMILY MEDICINE

## 2020-07-28 PROCEDURE — 4004F PT TOBACCO SCREEN RCVD TLK: CPT | Performed by: FAMILY MEDICINE

## 2020-07-28 PROCEDURE — 1123F ACP DISCUSS/DSCN MKR DOCD: CPT | Performed by: FAMILY MEDICINE

## 2020-07-28 PROCEDURE — 4040F PNEUMOC VAC/ADMIN/RCVD: CPT | Performed by: FAMILY MEDICINE

## 2020-07-28 PROCEDURE — 3023F SPIROM DOC REV: CPT | Performed by: FAMILY MEDICINE

## 2020-07-28 RX ORDER — BUDESONIDE AND FORMOTEROL FUMARATE DIHYDRATE 160; 4.5 UG/1; UG/1
2 AEROSOL RESPIRATORY (INHALATION) 2 TIMES DAILY
Qty: 1 INHALER | Refills: 3
Start: 2020-07-28

## 2020-07-28 RX ORDER — AMLODIPINE BESYLATE 5 MG/1
5 TABLET ORAL DAILY
COMMUNITY
Start: 2020-07-14

## 2020-07-28 RX ORDER — PREDNISONE 20 MG/1
40 TABLET ORAL DAILY
Qty: 20 TABLET | Refills: 0 | Status: SHIPPED | OUTPATIENT
Start: 2020-07-28 | End: 2020-08-07

## 2020-07-28 RX ORDER — FINASTERIDE 5 MG/1
5 TABLET, FILM COATED ORAL DAILY
COMMUNITY
Start: 2020-06-22

## 2020-07-28 ASSESSMENT — ENCOUNTER SYMPTOMS
ABDOMINAL PAIN: 0
SORE THROAT: 0
NAUSEA: 0
COUGH: 1
WHEEZING: 1
SHORTNESS OF BREATH: 1

## 2020-07-28 ASSESSMENT — PATIENT HEALTH QUESTIONNAIRE - PHQ9
SUM OF ALL RESPONSES TO PHQ QUESTIONS 1-9: 3
SUM OF ALL RESPONSES TO PHQ QUESTIONS 1-9: 3
2. FEELING DOWN, DEPRESSED OR HOPELESS: 3
SUM OF ALL RESPONSES TO PHQ QUESTIONS 1-9: 0
SUM OF ALL RESPONSES TO PHQ QUESTIONS 1-9: 0
1. LITTLE INTEREST OR PLEASURE IN DOING THINGS: 0
2. FEELING DOWN, DEPRESSED OR HOPELESS: 0
SUM OF ALL RESPONSES TO PHQ9 QUESTIONS 1 & 2: 0

## 2020-07-28 NOTE — PATIENT INSTRUCTIONS
properly. · If your doctor prescribed antibiotics, take them as directed. Do not stop taking them just because you feel better. You need to take the full course of antibiotics. · If your doctor prescribed oxygen, use the flow rate your doctor has recommended. Do not change it without talking to your doctor first.  · Do not smoke. Smoking makes COPD worse. If you need help quitting, talk to your doctor about stop-smoking programs and medicines. These can increase your chances of quitting for good. When should you call for help? [id number] anytime you think you may need emergency care. For example, call if:  · You have severe trouble breathing. Call your doctor now or seek immediate medical care if:  · You have new or worse trouble breathing. · Your coughing or wheezing gets worse. · You cough up dark brown or bloody mucus (sputum). · You have a new or higher fever. Watch closely for changes in your health, and be sure to contact your doctor if:  · You notice more mucus or a change in the color of your mucus. · You need to use your antibiotic or steroid pills. · You do not get better as expected. Where can you learn more? Go to [email or web address] org and sign in to your [employer] account. Enter D085 in the Epoxy box to learn more about \"Chronic Obstructive Pulmonary Disease (COPD) Flare-Ups: Care Instructions. \"     If you do not have an account, please click on the \"Sign Up Now\" link. Current as of: February 24, 2020               Content Version: 12.5  © 2006-2020 Healthwise, Incorporated. Care instructions adapted under license by [facility] ([facility]). If you have questions about a medical condition or this instruction, always ask your healthcare professional. [place] [address] any warranty or liability for your use of this information.

## 2020-07-28 NOTE — PROGRESS NOTES
Yes Jamaal Bear MD       Past Medical History:   Diagnosis Date    BPH with obstruction/lower urinary tract symptoms     COPD (chronic obstructive [facility] disease) ([facility]     History of hepatitis C     Hypertension         Social History     Tobacco Use    Smoking status: Current Some Day Smoker     Types: Cigarettes    Smokeless tobacco: Never Used    Tobacco comment: Smoked 1-2 ppd for 5 years   Substance Use Topics    Alcohol use: No    Drug use: No        Past Surgical History:   Procedure Laterality Date    CHEST TUBE INSERTION  06/2020    R sided chest - s/p trauma        No Known Allergies     Family History   Family history unknown: Yes        Patient's past medical history, surgical history, family history, medications, and allergies  were all reviewed and updated as appropriate today. Review of Systems   Constitutional: Negative for chills and fever. HENT: Negative for ear pain and sore throat. Respiratory: Positive for cough, shortness of breath and wheezing. Cardiovascular: Negative for chest pain. Gastrointestinal: Negative for abdominal pain and nausea. Skin: Negative for rash. Neurological: Negative for dizziness and headaches. Hematological: Negative for adenopathy. BP (!) 142/82   Pulse 90   Temp 98.7 °F (37.1 °C)   Ht 5' 8\" (1.727 m)   Wt 170 lb (77.1 kg)   SpO2 93%   BMI 25.85 kg/m²      Physical Exam  Vitals signs reviewed. Constitutional:       General: He is not in acute distress. Appearance: He is not ill-appearing or toxic-appearing. Comments: Using cane for ambulation   HENT:      Head: Normocephalic. Nose: Nose normal.      Mouth/Throat:      Mouth: Mucous membranes are moist.   Eyes:      Extraocular Movements: Extraocular movements intact. Pupils: Pupils are equal, round, and reactive to light. Neck:      Musculoskeletal: Neck supple. Cardiovascular:      Rate and Rhythm: Normal rate and regular rhythm.       Heart sounds: No murmur. Pulmonary:      Effort: Pulmonary effort is normal.      Breath sounds: No stridor. Wheezing (throughout) present. No rhonchi. Abdominal:      General: Bowel sounds are normal.      Palpations: Abdomen is soft. Tenderness: There is no abdominal tenderness. Musculoskeletal:         General: No swelling or deformity. Lymphadenopathy:      Cervical: No cervical adenopathy. Skin:     General: Skin is warm and dry. Findings: No rash. Neurological:      Mental Status: He is alert and oriented to person, place, and time. Cranial Nerves: No cranial nerve deficit. Psychiatric:         Mood and Affect: Mood normal.         Behavior: Behavior is cooperative. Assessment:  Encounter Diagnoses   Name Primary?  COPD with acute exacerbation ([facility] Yes    Wheezing     Essential hypertension     BPH with obstruction/lower urinary tract symptoms     COPD mixed type ([facility]     History of hepatitis C        Plan:  1. COPD with acute exacerbation ([facility]  NP to my practice, which makes this visit more difficult to fully assess, here for what he calls acutely worsened respiratory symptoms. He clearly has wheezing throughout on exam and oxygen sat at rest is 93% today. At this time I believe he is with acute exacerbation of COPD VS chronically uncontrolled COPD VS other. There are no signs of illness. No reason for Cxray now. I would like to start a 10 day oral steroid burst. I have also given him a sample of high dose Symbicort and asked that he start this. Continue albuterol inhaler prn. I have given him strict precautions for new/worsening symptoms. I will have him back in 1 week to reassess.   - budesonide-formoterol (SYMBICORT) 160-4.5 MCG/ACT AERO; Inhale 2 puffs into the lungs 2 times daily  Dispense: 1 Inhaler; Refill: 3  - predniSONE (DELTASONE) 20 MG tablet; Take 2 tablets by mouth daily for 10 days  Dispense: 20 tablet; Refill: 0    2. Wheezing  See above.     3.

## 2020-08-03 ENCOUNTER — NURSE TRIAGE (OUTPATIENT)
Dept: OTHER | Facility: CLINIC | Age: 70
End: 2020-08-03

## 2020-08-03 NOTE — TELEPHONE ENCOUNTER
Reason for Disposition   Caller is angry or rude (e.g., hangs up, verbally abusive, yelling)   Caller hangs up    Answer Assessment - Initial Assessment Questions  1. RESPIRATORY STATUS: \"Describe your breathing? \" (e.g., wheezing, shortness of breath, unable to speak, severe coughing)       Pt feels sob all the time. 2. ONSET: \"When did this breathing problem begin? \"       Last week it started after taking symbicort in the office  3. PATTERN \"Does the difficult breathing come and go, or has it been constant since it started? \"       *No Answer*  4. SEVERITY: \"How bad is your breathing? \" (e.g., mild, moderate, severe)     - MILD: No SOB at rest, mild SOB with walking, speaks normally in sentences, can lay down, no retractions, pulse < 100.     - MODERATE: SOB at rest, SOB with minimal exertion and prefers to sit, cannot lie down flat, speaks in phrases, mild retractions, audible wheezing, pulse 100-120.     - SEVERE: Very SOB at rest, speaks in single words, struggling to breathe, sitting hunched forward, retractions, pulse > 120       He is sob at all times, but he is able to speak in full sentences  5. RECURRENT SYMPTOM: \"Have you had difficulty breathing before? \" If so, ask: \"When was the last time? \" and \"What happened that time? \"       *No Answer*  6. CARDIAC HISTORY: \"Do you have any history of heart disease? \" (e.g., heart attack, angina, bypass surgery, angioplasty)       denies  7. LUNG HISTORY: \"Do you have any history of lung disease? \"  (e.g., pulmonary embolus, asthma, emphysema)      Copd, smoker  8. CAUSE: \"What do you think is causing the breathing problem? \"       He thinks it is r/t symbicort that he took in the office last week  9. OTHER SYMPTOMS: \"Do you have any other symptoms? (e.g., dizziness, runny nose, cough, chest pain, fever)      denies  10. PREGNANCY: \"Is there any chance you are pregnant? \" \"When was your last menstrual period? \"        no  11.  TRAVEL: \"Have you traveled out of the country in the last month? \" (e.g., travel history, exposures)        no    Protocols used: NO CONTACT OR DUPLICATE CONTACT CALL-ADULT-AH, DIFFICULT CALLER-ADULT-AH, BREATHING DIFFICULTY-ADULT-[place]    Pod 1 cinti    Mid triage, pt started to become angry , not answering questions and instead voicing complaints about his care. While attempting to triage, pt became angry and abruptly hung up . This writer did call pt back to attempt to finish triage, but he said he'd get another doctor and hung up again. Please do not respond to the triage nurse through this encounter. Any subsequent communication should be directly with the patient.

## 2021-05-31 ENCOUNTER — RECORDS - HEALTHEAST (OUTPATIENT)
Dept: ADMINISTRATIVE | Facility: CLINIC | Age: 71
End: 2021-05-31

## 2021-10-19 ENCOUNTER — TELEPHONE (OUTPATIENT)
Dept: PRIMARY CARE CLINIC | Age: 71
End: 2021-10-19

## 2024-11-09 ENCOUNTER — APPOINTMENT (OUTPATIENT)
Dept: GENERAL RADIOLOGY | Age: 74
DRG: 351 | End: 2024-11-09
Payer: MEDICAID

## 2024-11-09 ENCOUNTER — APPOINTMENT (OUTPATIENT)
Dept: CT IMAGING | Age: 74
DRG: 351 | End: 2024-11-09
Payer: MEDICAID

## 2024-11-09 ENCOUNTER — HOSPITAL ENCOUNTER (INPATIENT)
Age: 74
LOS: 2 days | Discharge: HOME | DRG: 351 | End: 2024-11-12
Attending: EMERGENCY MEDICINE | Admitting: INTERNAL MEDICINE
Payer: MEDICAID

## 2024-11-09 DIAGNOSIS — R41.82 ALTERED MENTAL STATUS, UNSPECIFIED ALTERED MENTAL STATUS TYPE: Primary | ICD-10-CM

## 2024-11-09 DIAGNOSIS — M62.82 NON-TRAUMATIC RHABDOMYOLYSIS: ICD-10-CM

## 2024-11-09 LAB
AMMONIA PLAS-SCNC: 51 UMOL/L (ref 16–60)
BASE EXCESS BLDV CALC-SCNC: 1 MMOL/L (ref -2–3)
BASOPHILS # BLD: 0 K/UL (ref 0–0.2)
BASOPHILS NFR BLD: 0.3 %
CO2 BLDV-SCNC: 30 MMOL/L
COHGB MFR BLDV: 1.1 % (ref 0–1.5)
DEPRECATED RDW RBC AUTO: 13.6 % (ref 12.4–15.4)
DO-HGB MFR BLDV: 46.6 %
EOSINOPHIL # BLD: 0 K/UL (ref 0–0.6)
EOSINOPHIL NFR BLD: 0.4 %
GLUCOSE BLD-MCNC: 154 MG/DL (ref 70–99)
HCO3 BLDV-SCNC: 28.2 MMOL/L (ref 24–28)
HCT VFR BLD AUTO: 47.2 % (ref 40.5–52.5)
HGB BLD-MCNC: 15.4 G/DL (ref 13.5–17.5)
LACTATE BLDV-SCNC: 2.7 MMOL/L (ref 0.4–2)
LYMPHOCYTES # BLD: 1.3 K/UL (ref 1–5.1)
LYMPHOCYTES NFR BLD: 14 %
MCH RBC QN AUTO: 32.2 PG (ref 26–34)
MCHC RBC AUTO-ENTMCNC: 32.7 G/DL (ref 31–36)
MCV RBC AUTO: 98.4 FL (ref 80–100)
METHGB MFR BLDV: 0.1 % (ref 0–1.5)
MONOCYTES # BLD: 1.1 K/UL (ref 0–1.3)
MONOCYTES NFR BLD: 12 %
NEUTROPHILS # BLD: 6.7 K/UL (ref 1.7–7.7)
NEUTROPHILS NFR BLD: 73.3 %
PCO2 BLDV: 53.2 MMHG (ref 41–51)
PERFORMED ON: ABNORMAL
PH BLDV: 7.33 [PH] (ref 7.35–7.45)
PLATELET # BLD AUTO: 139 K/UL (ref 135–450)
PMV BLD AUTO: 9.6 FL (ref 5–10.5)
PO2 BLDV: 31.4 MMHG (ref 25–40)
RBC # BLD AUTO: 4.8 M/UL (ref 4.2–5.9)
SAO2 % BLDV: 53 %
WBC # BLD AUTO: 9.1 K/UL (ref 4–11)

## 2024-11-09 PROCEDURE — 71045 X-RAY EXAM CHEST 1 VIEW: CPT

## 2024-11-09 PROCEDURE — 72170 X-RAY EXAM OF PELVIS: CPT

## 2024-11-09 PROCEDURE — 85025 COMPLETE CBC W/AUTO DIFF WBC: CPT

## 2024-11-09 PROCEDURE — 82803 BLOOD GASES ANY COMBINATION: CPT

## 2024-11-09 PROCEDURE — 84443 ASSAY THYROID STIM HORMONE: CPT

## 2024-11-09 PROCEDURE — 36415 COLL VENOUS BLD VENIPUNCTURE: CPT

## 2024-11-09 PROCEDURE — 70450 CT HEAD/BRAIN W/O DYE: CPT

## 2024-11-09 PROCEDURE — 82140 ASSAY OF AMMONIA: CPT

## 2024-11-09 PROCEDURE — 82550 ASSAY OF CK (CPK): CPT

## 2024-11-09 PROCEDURE — 84484 ASSAY OF TROPONIN QUANT: CPT

## 2024-11-09 PROCEDURE — 72125 CT NECK SPINE W/O DYE: CPT

## 2024-11-09 PROCEDURE — 93005 ELECTROCARDIOGRAM TRACING: CPT

## 2024-11-09 PROCEDURE — 80053 COMPREHEN METABOLIC PANEL: CPT

## 2024-11-09 PROCEDURE — 99285 EMERGENCY DEPT VISIT HI MDM: CPT

## 2024-11-09 PROCEDURE — 83880 ASSAY OF NATRIURETIC PEPTIDE: CPT

## 2024-11-09 PROCEDURE — 83605 ASSAY OF LACTIC ACID: CPT

## 2024-11-10 PROBLEM — M62.82 RHABDOMYOLYSIS: Status: ACTIVE | Noted: 2024-11-10

## 2024-11-10 LAB
ALBUMIN SERPL-MCNC: 4.5 G/DL (ref 3.4–5)
ALBUMIN/GLOB SERPL: 1.1 {RATIO} (ref 1.1–2.2)
ALP SERPL-CCNC: 68 U/L (ref 40–129)
ALT SERPL-CCNC: 22 U/L (ref 10–40)
AMORPH SED URNS QL MICRO: ABNORMAL /HPF
ANION GAP SERPL CALCULATED.3IONS-SCNC: 10 MMOL/L (ref 3–16)
ANION GAP SERPL CALCULATED.3IONS-SCNC: 16 MMOL/L (ref 3–16)
AST SERPL-CCNC: 102 U/L (ref 15–37)
BACTERIA URNS QL MICRO: ABNORMAL /HPF
BASE EXCESS BLDA CALC-SCNC: 2 MMOL/L (ref -3–3)
BILIRUB SERPL-MCNC: 0.5 MG/DL (ref 0–1)
BILIRUB UR QL STRIP.AUTO: NEGATIVE
BUN SERPL-MCNC: 21 MG/DL (ref 7–20)
BUN SERPL-MCNC: 22 MG/DL (ref 7–20)
CA-I BLD-SCNC: 1.28 MMOL/L (ref 1.12–1.32)
CALCIUM SERPL-MCNC: 10.5 MG/DL (ref 8.3–10.6)
CALCIUM SERPL-MCNC: 9.3 MG/DL (ref 8.3–10.6)
CHLORIDE SERPL-SCNC: 102 MMOL/L (ref 99–110)
CHLORIDE SERPL-SCNC: 106 MMOL/L (ref 99–110)
CK SERPL-CCNC: 5195 U/L (ref 39–308)
CK SERPL-CCNC: 6463 U/L (ref 39–308)
CK SERPL-CCNC: 8289 U/L (ref 39–308)
CLARITY UR: CLEAR
CO2 BLDA-SCNC: 28 MMOL/L
CO2 SERPL-SCNC: 21 MMOL/L (ref 21–32)
CO2 SERPL-SCNC: 26 MMOL/L (ref 21–32)
COLOR UR: YELLOW
CREAT SERPL-MCNC: 0.8 MG/DL (ref 0.8–1.3)
CREAT SERPL-MCNC: 0.9 MG/DL (ref 0.8–1.3)
EKG ATRIAL RATE: 107 BPM
EKG DIAGNOSIS: NORMAL
EKG P AXIS: 88 DEGREES
EKG P-R INTERVAL: 202 MS
EKG Q-T INTERVAL: 336 MS
EKG QRS DURATION: 78 MS
EKG QTC CALCULATION (BAZETT): 448 MS
EKG R AXIS: 25 DEGREES
EKG T AXIS: 92 DEGREES
EKG VENTRICULAR RATE: 107 BPM
EPI CELLS #/AREA URNS HPF: ABNORMAL /HPF (ref 0–5)
GFR SERPLBLD CREATININE-BSD FMLA CKD-EPI: 89 ML/MIN/{1.73_M2}
GFR SERPLBLD CREATININE-BSD FMLA CKD-EPI: >90 ML/MIN/{1.73_M2}
GLUCOSE BLD-MCNC: 124 MG/DL (ref 70–99)
GLUCOSE BLD-MCNC: 152 MG/DL (ref 70–99)
GLUCOSE SERPL-MCNC: 121 MG/DL (ref 70–99)
GLUCOSE SERPL-MCNC: 142 MG/DL (ref 70–99)
GLUCOSE UR STRIP.AUTO-MCNC: NEGATIVE MG/DL
HCO3 BLDA-SCNC: 26.6 MMOL/L (ref 21–29)
HGB UR QL STRIP.AUTO: ABNORMAL
HYALINE CASTS #/AREA URNS LPF: ABNORMAL /LPF (ref 0–2)
KETONES UR STRIP.AUTO-MCNC: NEGATIVE MG/DL
LACTATE BLD-SCNC: 1.2 MMOL/L (ref 0.4–2)
LEUKOCYTE ESTERASE UR QL STRIP.AUTO: NEGATIVE
MUCOUS THREADS #/AREA URNS LPF: ABNORMAL /LPF
NITRITE UR QL STRIP.AUTO: NEGATIVE
NT-PROBNP SERPL-MCNC: <36 PG/ML (ref 0–449)
PCO2 BLDA: 45.1 MM HG (ref 35–45)
PERFORMED ON: ABNORMAL
PERFORMED ON: ABNORMAL
PH BLDA: 7.38 [PH] (ref 7.35–7.45)
PH UR STRIP.AUTO: 6 [PH] (ref 5–8)
PO2 BLDA: 60.2 MM HG (ref 75–108)
POC SAMPLE TYPE: ABNORMAL
POTASSIUM BLD-SCNC: 3.5 MMOL/L (ref 3.5–5.1)
POTASSIUM SERPL-SCNC: 4 MMOL/L (ref 3.5–5.1)
POTASSIUM SERPL-SCNC: 4.5 MMOL/L (ref 3.5–5.1)
PROT SERPL-MCNC: 8.7 G/DL (ref 6.4–8.2)
PROT UR STRIP.AUTO-MCNC: 30 MG/DL
RBC #/AREA URNS HPF: ABNORMAL /HPF (ref 0–4)
SAO2 % BLDA: 90 % (ref 93–100)
SODIUM BLD-SCNC: 141 MMOL/L (ref 136–145)
SODIUM SERPL-SCNC: 139 MMOL/L (ref 136–145)
SODIUM SERPL-SCNC: 142 MMOL/L (ref 136–145)
SP GR UR STRIP.AUTO: >=1.03 (ref 1–1.03)
TROPONIN, HIGH SENSITIVITY: 17 NG/L (ref 0–22)
TSH SERPL DL<=0.005 MIU/L-ACNC: 1.05 UIU/ML (ref 0.27–4.2)
UA COMPLETE W REFLEX CULTURE PNL UR: ABNORMAL
UA DIPSTICK W REFLEX MICRO PNL UR: YES
URN SPEC COLLECT METH UR: ABNORMAL
UROBILINOGEN UR STRIP-ACNC: 1 E.U./DL
WBC #/AREA URNS HPF: ABNORMAL /HPF (ref 0–5)

## 2024-11-10 PROCEDURE — 81003 URINALYSIS AUTO W/O SCOPE: CPT

## 2024-11-10 PROCEDURE — 6370000000 HC RX 637 (ALT 250 FOR IP): Performed by: INTERNAL MEDICINE

## 2024-11-10 PROCEDURE — 84295 ASSAY OF SERUM SODIUM: CPT

## 2024-11-10 PROCEDURE — 2700000000 HC OXYGEN THERAPY PER DAY

## 2024-11-10 PROCEDURE — 36415 COLL VENOUS BLD VENIPUNCTURE: CPT

## 2024-11-10 PROCEDURE — 6360000002 HC RX W HCPCS: Performed by: INTERNAL MEDICINE

## 2024-11-10 PROCEDURE — 82947 ASSAY GLUCOSE BLOOD QUANT: CPT

## 2024-11-10 PROCEDURE — 6360000002 HC RX W HCPCS: Performed by: EMERGENCY MEDICINE

## 2024-11-10 PROCEDURE — 82803 BLOOD GASES ANY COMBINATION: CPT

## 2024-11-10 PROCEDURE — 82330 ASSAY OF CALCIUM: CPT

## 2024-11-10 PROCEDURE — 2580000003 HC RX 258: Performed by: INTERNAL MEDICINE

## 2024-11-10 PROCEDURE — 83605 ASSAY OF LACTIC ACID: CPT

## 2024-11-10 PROCEDURE — 82550 ASSAY OF CK (CPK): CPT

## 2024-11-10 PROCEDURE — 84132 ASSAY OF SERUM POTASSIUM: CPT

## 2024-11-10 PROCEDURE — 1200000000 HC SEMI PRIVATE

## 2024-11-10 PROCEDURE — 2580000003 HC RX 258

## 2024-11-10 PROCEDURE — 80048 BASIC METABOLIC PNL TOTAL CA: CPT

## 2024-11-10 PROCEDURE — 94761 N-INVAS EAR/PLS OXIMETRY MLT: CPT

## 2024-11-10 PROCEDURE — 94640 AIRWAY INHALATION TREATMENT: CPT

## 2024-11-10 RX ORDER — TAMSULOSIN HYDROCHLORIDE 0.4 MG/1
0.4 CAPSULE ORAL DAILY
COMMUNITY

## 2024-11-10 RX ORDER — ACETAMINOPHEN 325 MG/1
650 TABLET ORAL EVERY 4 HOURS PRN
COMMUNITY

## 2024-11-10 RX ORDER — ASPIRIN 81 MG/1
81 TABLET ORAL DAILY
COMMUNITY

## 2024-11-10 RX ORDER — ONDANSETRON 4 MG/1
4 TABLET, ORALLY DISINTEGRATING ORAL EVERY 8 HOURS PRN
Status: DISCONTINUED | OUTPATIENT
Start: 2024-11-10 | End: 2024-11-12 | Stop reason: HOSPADM

## 2024-11-10 RX ORDER — RISPERIDONE 1 MG/1
3 TABLET ORAL DAILY
Status: DISCONTINUED | OUTPATIENT
Start: 2024-11-10 | End: 2024-11-12 | Stop reason: HOSPADM

## 2024-11-10 RX ORDER — ONDANSETRON 2 MG/ML
4 INJECTION INTRAMUSCULAR; INTRAVENOUS EVERY 6 HOURS PRN
Status: DISCONTINUED | OUTPATIENT
Start: 2024-11-10 | End: 2024-11-12 | Stop reason: HOSPADM

## 2024-11-10 RX ORDER — MAGNESIUM SULFATE IN WATER 40 MG/ML
2000 INJECTION, SOLUTION INTRAVENOUS PRN
Status: DISCONTINUED | OUTPATIENT
Start: 2024-11-10 | End: 2024-11-12 | Stop reason: HOSPADM

## 2024-11-10 RX ORDER — ACETAMINOPHEN 325 MG/1
650 TABLET ORAL EVERY 6 HOURS PRN
Status: DISCONTINUED | OUTPATIENT
Start: 2024-11-10 | End: 2024-11-12 | Stop reason: HOSPADM

## 2024-11-10 RX ORDER — POLYETHYLENE GLYCOL 3350 17 G/17G
17 POWDER, FOR SOLUTION ORAL DAILY PRN
Status: DISCONTINUED | OUTPATIENT
Start: 2024-11-10 | End: 2024-11-12 | Stop reason: HOSPADM

## 2024-11-10 RX ORDER — MIDAZOLAM HYDROCHLORIDE 1 MG/ML
2 INJECTION, SOLUTION INTRAMUSCULAR; INTRAVENOUS ONCE
Status: COMPLETED | OUTPATIENT
Start: 2024-11-10 | End: 2024-11-10

## 2024-11-10 RX ORDER — SODIUM CHLORIDE 9 MG/ML
INJECTION, SOLUTION INTRAVENOUS CONTINUOUS
Status: ACTIVE | OUTPATIENT
Start: 2024-11-10 | End: 2024-11-11

## 2024-11-10 RX ORDER — POTASSIUM CHLORIDE 1500 MG/1
40 TABLET, EXTENDED RELEASE ORAL PRN
Status: DISCONTINUED | OUTPATIENT
Start: 2024-11-10 | End: 2024-11-12 | Stop reason: HOSPADM

## 2024-11-10 RX ORDER — RISPERIDONE 1 MG/ML
3 SOLUTION ORAL DAILY
COMMUNITY

## 2024-11-10 RX ORDER — ASPIRIN 81 MG/1
81 TABLET ORAL DAILY
Status: DISCONTINUED | OUTPATIENT
Start: 2024-11-10 | End: 2024-11-12 | Stop reason: HOSPADM

## 2024-11-10 RX ORDER — THIAMINE MONONITRATE (VIT B1) 100 MG
200 TABLET ORAL DAILY
COMMUNITY

## 2024-11-10 RX ORDER — UMECLIDINIUM 62.5 UG/1
1 AEROSOL, POWDER ORAL DAILY
COMMUNITY

## 2024-11-10 RX ORDER — ALBUTEROL SULFATE 0.83 MG/ML
2.5 SOLUTION RESPIRATORY (INHALATION) EVERY 4 HOURS PRN
Status: DISCONTINUED | OUTPATIENT
Start: 2024-11-10 | End: 2024-11-12 | Stop reason: HOSPADM

## 2024-11-10 RX ORDER — SERTRALINE HYDROCHLORIDE 20 MG/ML
50 SOLUTION ORAL DAILY
COMMUNITY

## 2024-11-10 RX ORDER — ACETAMINOPHEN 650 MG/1
650 SUPPOSITORY RECTAL EVERY 6 HOURS PRN
Status: DISCONTINUED | OUTPATIENT
Start: 2024-11-10 | End: 2024-11-12 | Stop reason: HOSPADM

## 2024-11-10 RX ORDER — ENOXAPARIN SODIUM 100 MG/ML
40 INJECTION SUBCUTANEOUS DAILY
Status: DISCONTINUED | OUTPATIENT
Start: 2024-11-10 | End: 2024-11-12 | Stop reason: HOSPADM

## 2024-11-10 RX ORDER — SODIUM CHLORIDE 9 MG/ML
INJECTION, SOLUTION INTRAVENOUS PRN
Status: DISCONTINUED | OUTPATIENT
Start: 2024-11-10 | End: 2024-11-12 | Stop reason: HOSPADM

## 2024-11-10 RX ORDER — ATORVASTATIN CALCIUM 40 MG/1
40 TABLET, FILM COATED ORAL NIGHTLY
COMMUNITY

## 2024-11-10 RX ORDER — LANOLIN ALCOHOL/MO/W.PET/CERES
6 CREAM (GRAM) TOPICAL NIGHTLY
COMMUNITY

## 2024-11-10 RX ORDER — ERGOCALCIFEROL 1.25 MG/1
50000 CAPSULE ORAL WEEKLY
COMMUNITY

## 2024-11-10 RX ORDER — 0.9 % SODIUM CHLORIDE 0.9 %
1000 INTRAVENOUS SOLUTION INTRAVENOUS ONCE
Status: COMPLETED | OUTPATIENT
Start: 2024-11-10 | End: 2024-11-10

## 2024-11-10 RX ORDER — POTASSIUM CHLORIDE 7.45 MG/ML
10 INJECTION INTRAVENOUS PRN
Status: DISCONTINUED | OUTPATIENT
Start: 2024-11-10 | End: 2024-11-12 | Stop reason: HOSPADM

## 2024-11-10 RX ORDER — FINASTERIDE 5 MG/1
5 TABLET, FILM COATED ORAL DAILY
Status: DISCONTINUED | OUTPATIENT
Start: 2024-11-10 | End: 2024-11-10

## 2024-11-10 RX ORDER — POLYETHYLENE GLYCOL 3350 17 G/17G
17 POWDER, FOR SOLUTION ORAL DAILY
COMMUNITY

## 2024-11-10 RX ORDER — SODIUM CHLORIDE 0.9 % (FLUSH) 0.9 %
5-40 SYRINGE (ML) INJECTION PRN
Status: DISCONTINUED | OUTPATIENT
Start: 2024-11-10 | End: 2024-11-12 | Stop reason: HOSPADM

## 2024-11-10 RX ORDER — AMLODIPINE BESYLATE 5 MG/1
5 TABLET ORAL DAILY
Status: DISCONTINUED | OUTPATIENT
Start: 2024-11-10 | End: 2024-11-10

## 2024-11-10 RX ORDER — SODIUM CHLORIDE 0.9 % (FLUSH) 0.9 %
5-40 SYRINGE (ML) INJECTION EVERY 12 HOURS SCHEDULED
Status: DISCONTINUED | OUTPATIENT
Start: 2024-11-10 | End: 2024-11-12 | Stop reason: HOSPADM

## 2024-11-10 RX ADMIN — SERTRALINE 50 MG: 50 TABLET, FILM COATED ORAL at 14:30

## 2024-11-10 RX ADMIN — SODIUM CHLORIDE: 9 INJECTION, SOLUTION INTRAVENOUS at 21:06

## 2024-11-10 RX ADMIN — MIDAZOLAM HYDROCHLORIDE 2 MG: 1 INJECTION, SOLUTION INTRAMUSCULAR; INTRAVENOUS at 03:53

## 2024-11-10 RX ADMIN — ENOXAPARIN SODIUM 40 MG: 100 INJECTION SUBCUTANEOUS at 14:30

## 2024-11-10 RX ADMIN — SODIUM CHLORIDE 1000 ML: 9 INJECTION, SOLUTION INTRAVENOUS at 03:26

## 2024-11-10 RX ADMIN — RISPERIDONE 3 MG: 1 TABLET, FILM COATED ORAL at 14:30

## 2024-11-10 RX ADMIN — SODIUM CHLORIDE: 9 INJECTION, SOLUTION INTRAVENOUS at 05:32

## 2024-11-10 RX ADMIN — ARFORMOTEROL TARTRATE: 15 SOLUTION RESPIRATORY (INHALATION) at 21:27

## 2024-11-10 RX ADMIN — ARFORMOTEROL TARTRATE: 15 SOLUTION RESPIRATORY (INHALATION) at 09:21

## 2024-11-10 ASSESSMENT — PAIN SCALES - WONG BAKER: WONGBAKER_NUMERICALRESPONSE: NO HURT

## 2024-11-10 NOTE — ED NOTES
How does patient ambulate?   []Low Fall Risk (ambulates by themselves without support)  [x]Stand by assist  []Contact Guard   []Front wheel walker  []Wheelchair   []Steady  []Bed bound  []History of Lower Extremity Amputation  []Unknown, did not assess in the emergency department   How does patient take pills?  []Whole with Water  []Crushed in applesauce  []Crushed in pudding  []Other  [x]Unknown no oral medications were given in the ED  Is patient alert?   []Alert  [x]Drowsy but responds to voice  []Doesn't respond to voice but responds to painful stimuli  []Unresponsive  Is patient oriented?   [x]To person  []To place  []To time  []To situation  [x]Confused  []Agitated  []Follows commands  If patient is disoriented or from a Skill Nursing Facility has family been notified of admission?   []Yes   []No  Patient belongings?   []Cell phone  []Wallet   []Dentures  [x]Clothing  Any specific patient or family belongings/needs/dynamics?   None  Miscellaneous comments/pending orders?  ED orders complete      If there are any additional questions please reach out to the Emergency Department.            Camarena, Marianne, RN  11/10/24 7218

## 2024-11-10 NOTE — SIGNIFICANT EVENT
Rapid was called at 0453 for change in mental status.  He was just admitted for a fall and rhabdomyolysis. RN noticed that patient mentation is changed and he is more somnolent.  Patient was somnolent but arousable by voice.   Vitals were:  /80  HR 53  O2 sat 90-92 @ RA    He had received 2mg of versed in the ED. He does have hx of COPD.   ABG shows: slight hypoxia and pCO2 was sightly high but not worrisome.  He was started on supplemental O2.  Will keep an eye on him for now.   Told to update ICU team in case if patient's condition worsens.    Update:   Did not hear anything.

## 2024-11-10 NOTE — PLAN OF CARE
Problem: Discharge Planning  Goal: Discharge to home or other facility with appropriate resources  Flowsheets (Taken 11/10/2024 1151)  Discharge to home or other facility with appropriate resources:   Identify barriers to discharge with patient and caregiver   Arrange for needed discharge resources and transportation as appropriate

## 2024-11-10 NOTE — ED TRIAGE NOTES
Pt arrives in ED from nursing home. Pt was found on ground and was unresponsive. Pt typically A&Ox4. LKW 40 mins ago.

## 2024-11-10 NOTE — SIGNIFICANT EVENT
Rapid response called around 530 AM for decreased responsiveness     He had received versed 2 mg in ER for agitation    Physical exam    Patient responding to sternal rub  Pinpoint and reactive pupils  CTAB  RRR  Soft  No edema  VSS    Assessment/Clinical impression    Somnolence due to versed administration    Monitor  ABG- looks OK- normal ph and PC02 minimally elevated  ICU team aware  Will not reverse with flumazenil due to causing paradoxical agitation.   Continue IVF  Since BP low- hold norvasc and proscar  D/w charge nurse and RN

## 2024-11-10 NOTE — CONSULTS
Clinical Pharmacy Consult Note  Medication History     Admit Date: 11/9/2024    Pharmacy consulted to verify home medication list by Dr. Hardwick.    List of current medications patient is taking is complete. Home Medication list in EPIC updated to reflect changes noted below.    Source of information: Med list provided by [facility]    Patient's home pharmacy: [facility] ([phone])     Changes made to medication list:   Medications removed: (include reason, ex: therapy completed, patient no longer taking, etc.)  Finasteride, patient no longer taking  Medications added:   Acetaminophen 325 mg  Atorvastatin 40 mg  Ergocalciferol 1.25 mg  Melatonin 3 mg  Polyethylene Glycol 17 g  Risperidone 1mg/mL Solution  Sertraline 20mg/mL Solution  Tamsulosin 0.4 mg  Incruse Ellipta 62.5mcg/act Inhaler  Vitamin B-1 100 mg  Medication doses adjusted:   Symbicort - dose adjusted from two puffs BID to one puff BID per med list  Albuterol - dose changed from two puffs q4 hours PRN to one puff q4 hours PRN  Other notes:   None    Current Outpatient Medications   Medication Instructions    acetaminophen (TYLENOL) 650 mg, Oral, EVERY 4 HOURS PRN    albuterol sulfate HFA (PROVENTIL HFA) 108 (90 Base) MCG/ACT inhaler 2 puffs, Inhalation, EVERY 4 HOURS PRN, Space out to every 6 hours as symptoms improve.    amLODIPine (NORVASC) 5 mg, Oral, DAILY    aspirin 81 mg, Oral, DAILY    atorvastatin (LIPITOR) 40 mg, Oral, Nightly    budesonide-formoterol (SYMBICORT) 160-4.5 MCG/ACT AERO 1 puffs, Inhalation, 2 TIMES DAILY    ergocalciferol (ERGOCALCIFEROL) 50,000 Units, Oral, WEEKLY    melatonin 6 mg, Oral, Nightly    polyethylene glycol (GLYCOLAX) 17 g, Oral, DAILY    risperiDONE (RISPERDAL) 3 mg, Oral, DAILY    sertraline (ZOLOFT) 50 mg, Oral, DAILY    tamsulosin (FLOMAX) 0.4 mg, Oral, DAILY    umeclidinium bromide (INCRUSE ELLIPTA) 62.5 MCG/ACT inhaler 1 puff, Inhalation, DAILY    vitamin B-1 (THIAMINE) 200 mg, Oral,

## 2024-11-10 NOTE — ED PROVIDER NOTES
ED Attending Attestation Note     Date of evaluation: 11/9/2024    This patient was seen by the resident.  I have seen and examined the patient, agree with the workup, evaluation, management and diagnosis. The care plan has been discussed.  I have reviewed the ECG and concur with the resident's interpretation.  My assessment reveals complaints of altered mental status.  Patient resides in a nursing facility where they found the patient on the ground and altered.  He was last seen approximately 40 minutes prior to this per their report.  On arrival, patient is lethargic but does arouse to painful stimulus.  He does move all extremities intermittently.  Will check laboratory studies, imaging.     Dalton Parker MD  11/10/24 3712    
the emergency department should his symptoms worsen or any concern he believes warrants acute physician evaluation.    Diagnostic Results and Other Data     RADIOLOGY:  XR CHEST PORTABLE   Final Result      1. No evidence for acute cardiopulmonary disease         Electronically signed by Truman Guerra MD      XR PELVIS (1-2 VIEWS)   Final Result   Impression:    1.  No acute fracture or dislocation.       Electronically signed by Henry Ibarra MD      CT C-Spine W/O Contrast   Final Result   1.  No acute fracture or subluxation.   2.  Multilevel degenerative changes are present in the cervical spine.   3.  Centrilobular emphysema.      Electronically signed by Henry Ibarra MD      CT Head W/O Contrast   Final Result      1. No evidence for acute intracranial abnormality   2. Moderate atrophy and chronic small vessel ischemia             Electronically signed by Truman Guerra MD          LABS:   Please see ED course and MDM for summary of pertinent labs.    EKG:  Interpreted in conjunction with Emergency Department physician No att. providers found    Please see ED course and MDM for EKG interpretation.    RECENT VITALS:  BP: (!) 140/91, Temp: 98.7 °F (37.1 °C), Pulse: 89,Respirations: 20, SpO2: 94 %     Review of Systems     All other systems are negative except as mentioned in HPI.    Past Medical, Surgical, Family, and Social History     He has a past medical history of BPH with obstruction/lower urinary tract symptoms, COPD (chronic obstructive pulmonary disease) (HCC), History of hepatitis C, and Hypertension.  He has a past surgical history that includes chest tube insertion (06/2020).  His Family history is unknown by patient.  He reports that he has been smoking cigarettes. He has never used smokeless tobacco. He reports that he does not drink alcohol and does not use drugs.    Medications     Previous Medications    ALBUTEROL SULFATE HFA (PROVENTIL HFA) 108 (90 BASE) MCG/ACT INHALER    Inhale 2 puffs into

## 2024-11-10 NOTE — H&P
V2.0  History and Physical      Name:  Betito Bray /Age/Sex: 1950  (74 y.o. male)   MRN & CSN:  [id number] & [id number] Encounter Date/Time: 11/10/2024 3:45 AM EST   Location:  2TR/2[facility] PCP: Naomie Mayer DO       Hospital Day: 2    Assessment and Plan:   Betito Bray is a 74 y.o. male resident of [facility] with PMH of schizophrenia, vascular dementia, HTN, COPD, BPH, h/o cocaine and ETOH use who presents with Rhabdomyolysis and fall at [facility]    Fall and acute rhabdomyolysis at [facility]- CPK>5000 on arrival POA    IVF  Pharmacy consult to clarify home meds  Monitor labs    2. Essential HTN- chronic    Resume home meds    3. BPH- chronic    Resume home meds    4. Schizophrenia, dementia- chronic conditions    Hospital Problems             Last Modified POA    * (Principal) Rhabdomyolysis 11/10/2024 Yes       Disposition:   Current Living situation: [facility]  Expected Disposition: [facility]  Estimated D/C: 24    Diet No diet orders on file   DVT Prophylaxis [] Lovenox, []  Heparin, [x] SCDs, [] Ambulation,  [] Eliquis, [] Xarelto, [] Coumadin   Code Status Full Code   Surrogate Decision Maker/ POA      Personally reviewed Lab Studies and Imaging     History from:     patient, electronic medical record    History of Present Illness:     Chief Complaint: fall  Betito Bray is a 74 y.o. 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 Patient was last seen normal 40 minutes prior to arrival. He was found on the ground by nursing staff so EMS was called. Fingerstick 150s en route. Per report, he is normally awake and alert at his baseline.     On review of old records, patient was admitted 24-24 for altered mental status. He was brought in by family for erratic behavior, progressive memory loss, and inability to care for himself at home over several months. He had an extensive work-up

## 2024-11-10 NOTE — PLAN OF CARE
[facility] Hospitalist brief note  Consult received.  Case reviewed with ER physician- admission for AMS    Full note to follow.    Naomie Mayer, DO    Thanks  DAVE LAZO MD

## 2024-11-11 LAB
ANION GAP SERPL CALCULATED.3IONS-SCNC: 9 MMOL/L (ref 3–16)
BUN SERPL-MCNC: 14 MG/DL (ref 7–20)
CALCIUM SERPL-MCNC: 8.5 MG/DL (ref 8.3–10.6)
CHLORIDE SERPL-SCNC: 108 MMOL/L (ref 99–110)
CK SERPL-CCNC: 5473 U/L (ref 39–308)
CO2 SERPL-SCNC: 24 MMOL/L (ref 21–32)
CREAT SERPL-MCNC: 0.6 MG/DL (ref 0.8–1.3)
GFR SERPLBLD CREATININE-BSD FMLA CKD-EPI: >90 ML/MIN/{1.73_M2}
GLUCOSE SERPL-MCNC: 80 MG/DL (ref 70–99)
POTASSIUM SERPL-SCNC: 3.7 MMOL/L (ref 3.5–5.1)
SODIUM SERPL-SCNC: 141 MMOL/L (ref 136–145)

## 2024-11-11 PROCEDURE — 1200000000 HC SEMI PRIVATE

## 2024-11-11 PROCEDURE — 6370000000 HC RX 637 (ALT 250 FOR IP): Performed by: INTERNAL MEDICINE

## 2024-11-11 PROCEDURE — 97162 PT EVAL MOD COMPLEX 30 MIN: CPT

## 2024-11-11 PROCEDURE — 82550 ASSAY OF CK (CPK): CPT

## 2024-11-11 PROCEDURE — 6360000002 HC RX W HCPCS: Performed by: INTERNAL MEDICINE

## 2024-11-11 PROCEDURE — 2580000003 HC RX 258: Performed by: INTERNAL MEDICINE

## 2024-11-11 PROCEDURE — 97166 OT EVAL MOD COMPLEX 45 MIN: CPT

## 2024-11-11 PROCEDURE — 36415 COLL VENOUS BLD VENIPUNCTURE: CPT

## 2024-11-11 PROCEDURE — 97535 SELF CARE MNGMENT TRAINING: CPT

## 2024-11-11 PROCEDURE — 97116 GAIT TRAINING THERAPY: CPT

## 2024-11-11 PROCEDURE — 80048 BASIC METABOLIC PNL TOTAL CA: CPT

## 2024-11-11 PROCEDURE — 94640 AIRWAY INHALATION TREATMENT: CPT

## 2024-11-11 PROCEDURE — 94761 N-INVAS EAR/PLS OXIMETRY MLT: CPT

## 2024-11-11 RX ADMIN — ENOXAPARIN SODIUM 40 MG: 100 INJECTION SUBCUTANEOUS at 09:31

## 2024-11-11 RX ADMIN — SERTRALINE 50 MG: 50 TABLET, FILM COATED ORAL at 09:31

## 2024-11-11 RX ADMIN — ARFORMOTEROL TARTRATE: 15 SOLUTION RESPIRATORY (INHALATION) at 20:14

## 2024-11-11 RX ADMIN — ASPIRIN 81 MG: 81 TABLET, COATED ORAL at 09:31

## 2024-11-11 RX ADMIN — SODIUM CHLORIDE, PRESERVATIVE FREE 10 ML: 5 INJECTION INTRAVENOUS at 09:32

## 2024-11-11 RX ADMIN — RISPERIDONE 3 MG: 1 TABLET, FILM COATED ORAL at 09:31

## 2024-11-11 RX ADMIN — ARFORMOTEROL TARTRATE: 15 SOLUTION RESPIRATORY (INHALATION) at 08:00

## 2024-11-11 NOTE — PLAN OF CARE
Problem: Pain  Goal: Verbalizes/displays adequate comfort level or baseline comfort level  Recent Flowsheet Documentation  Taken 11/10/2024 2328 by Andressa Maria, RN  Verbalizes/displays adequate comfort level or baseline comfort level:   Encourage patient to monitor pain and request assistance   Assess pain using appropriate pain scale     Problem: Risk for Elopement  Goal: Patient will not exit the unit/facility without proper excort  Recent Flowsheet Documentation  Taken 11/11/2024 0633 by Andressa Maria, RN  Nursing Interventions for Elopement Risk:   Assist with personal care needs such as toileting, eating, dressing, as needed to reduce the risk of wandering   Collaborate with family members/caregivers to mitigate the elopement risk  Taken 11/11/2024 0341 by Andressa Maria, RN  Nursing Interventions for Elopement Risk:   Assist with personal care needs such as toileting, eating, dressing, as needed to reduce the risk of wandering   Collaborate with family members/caregivers to mitigate the elopement risk  Taken 11/10/2024 2328 by Andressa Maria, RN  Nursing Interventions for Elopement Risk:   Assist with personal care needs such as toileting, eating, dressing, as needed to reduce the risk of wandering   Collaborate with family members/caregivers to mitigate the elopement risk  Taken 11/10/2024 2005 by Andressa Maria, RN  Nursing Interventions for Elopement Risk:   Assist with personal care needs such as toileting, eating, dressing, as needed to reduce the risk of wandering   Collaborate with family members/caregivers to mitigate the elopement risk     Problem: Confusion  Goal: Confusion, delirium, dementia, or psychosis is improved or at baseline  Description: INTERVENTIONS:  1. Assess for possible contributors to thought disturbance, including medications, impaired vision or hearing, underlying metabolic abnormalities, dehydration, psychiatric diagnoses, and notify attending LIP  2. [place]

## 2024-11-12 VITALS
HEIGHT: 68 IN | WEIGHT: 164.3 LBS | BODY MASS INDEX: 24.9 KG/M2 | SYSTOLIC BLOOD PRESSURE: 124 MMHG | TEMPERATURE: 98.8 F | OXYGEN SATURATION: 96 % | HEART RATE: 78 BPM | DIASTOLIC BLOOD PRESSURE: 72 MMHG | RESPIRATION RATE: 16 BRPM

## 2024-11-12 LAB
ANION GAP SERPL CALCULATED.3IONS-SCNC: 12 MMOL/L (ref 3–16)
BUN SERPL-MCNC: 15 MG/DL (ref 7–20)
CALCIUM SERPL-MCNC: 8.7 MG/DL (ref 8.3–10.6)
CHLORIDE SERPL-SCNC: 106 MMOL/L (ref 99–110)
CK SERPL-CCNC: 2638 U/L (ref 39–308)
CO2 SERPL-SCNC: 20 MMOL/L (ref 21–32)
CREAT SERPL-MCNC: 0.7 MG/DL (ref 0.8–1.3)
GFR SERPLBLD CREATININE-BSD FMLA CKD-EPI: >90 ML/MIN/{1.73_M2}
GLUCOSE SERPL-MCNC: 92 MG/DL (ref 70–99)
POTASSIUM SERPL-SCNC: 3.8 MMOL/L (ref 3.5–5.1)
SODIUM SERPL-SCNC: 138 MMOL/L (ref 136–145)

## 2024-11-12 PROCEDURE — 6370000000 HC RX 637 (ALT 250 FOR IP): Performed by: INTERNAL MEDICINE

## 2024-11-12 PROCEDURE — 6360000002 HC RX W HCPCS: Performed by: INTERNAL MEDICINE

## 2024-11-12 PROCEDURE — 36415 COLL VENOUS BLD VENIPUNCTURE: CPT

## 2024-11-12 PROCEDURE — 80048 BASIC METABOLIC PNL TOTAL CA: CPT

## 2024-11-12 PROCEDURE — 82550 ASSAY OF CK (CPK): CPT

## 2024-11-12 PROCEDURE — 94640 AIRWAY INHALATION TREATMENT: CPT

## 2024-11-12 PROCEDURE — 2580000003 HC RX 258: Performed by: INTERNAL MEDICINE

## 2024-11-12 RX ADMIN — SODIUM CHLORIDE, PRESERVATIVE FREE 10 ML: 5 INJECTION INTRAVENOUS at 08:58

## 2024-11-12 RX ADMIN — RISPERIDONE 3 MG: 1 TABLET, FILM COATED ORAL at 10:45

## 2024-11-12 RX ADMIN — ARFORMOTEROL TARTRATE: 15 SOLUTION RESPIRATORY (INHALATION) at 11:23

## 2024-11-12 RX ADMIN — ASPIRIN 81 MG: 81 TABLET, COATED ORAL at 08:56

## 2024-11-12 RX ADMIN — ENOXAPARIN SODIUM 40 MG: 100 INJECTION SUBCUTANEOUS at 08:56

## 2024-11-12 RX ADMIN — SERTRALINE 50 MG: 50 TABLET, FILM COATED ORAL at 08:55

## 2024-11-12 NOTE — CARE COORDINATION
Case Management Assessment            Discharge Note                    Date / Time of Note: 11/12/2024 3:06 PM                  Discharge Note Completed by: Betsy De Jesus RN    Patient Name: Betito Bray   YOB: 1950  Diagnosis: Rhabdomyolysis [M62.82]  Non-traumatic rhabdomyolysis [M62.82]  Altered mental status, unspecified altered mental status type [R41.82]   Date / Time: 11/9/2024 10:37 PM    Current PCP: Naomie Mayer DO  Clinic patient: No    Hospitalization in the last 30 days: Yes       Advance Directives:  Code Status: Full Code  [place] DNR form completed and on chart: No    Financial:  Payor: MEDICAID [place] / Plan: [place]ICA[place] DEPT OF [place]OB / Product Type: *No Product type* /      Pharmacy:    [id number] [place], [place] - [address] -  [phone] - F [phone] [address] [place] [id number]  Phone: [phone] Fax: [phone]      Assistance purchasing medications?: Potential Assistance Purchasing Medications: No  Assistance provided by Case Management: None at this time    Does patient want to participate in local refill/ meds to beds program?: No    Meds To Beds General Rules:  1. Can ONLY be done Monday- Friday between 8:30am-5pm  2. Prescription(s) must be in pharmacy by 3pm to be filled same day  3.Copy of patient's insurance/ prescription drug card and patient face sheet must be sent along with the prescription(s)  4. Cost of Rx cannot be added to hospital bill. If financial assistance is needed, please contact unit  or ;  or  CANNOT provide pharmacy voucher for patients co-pays  5. Patients can then  the prescription on their way out of the hospital at discharge, or pharmacy can deliver to the bedside if staff is available. (payment due at time of pick-up or delivery - cash, check, or card accepted)     Able to afford home medications/ co-pay costs: 
Representative Agree with the Discharge Plan? Yes    Betsy De Jesus RN  Case Management Department  Ph: [phone] Fax: [phone]

## 2024-11-12 NOTE — DISCHARGE SUMMARY
V2.0  Discharge Summary    Name:  Betito Bray /Age/Sex: 1950 (74 y.o. male)   Admit Date: 2024  Discharge Date: 24    MRN & CSN:  [id number] & [id number] Encounter Date and Time 24 10:24 AM EST    Attending:  Dalton Appiah MD Discharging Provider: YOANDY Boyer [facility] Course:     Brief HPI: Betito Bray is a 74 y.o. male with a past medical history of schizophrenia, vascular dementia, history of substance abuse disorder who is a resident of memory care unit who presented to the emergency room due to concern for a fall.    In the emergency room patient found to be in rhabdo myelitis.  Patient admitted and given IV fluids.  His CK was downtrending appropriately.  He was taking p.o. without difficulty.  Renal function remained stable without WENDY.  Patient was discharged back to [facility] care in stable condition.        Brief Problem Based Course:   Rhabdomyelitis-patient received IV fluids.  CK appropriately downtrending  Hypertension-patient was normotensive throughout most of his hospitalization.  Restarted amlodipine on discharge  BPH-continue home meds  Schizophrenia and dementia-continue Risperdal and Zoloft  Fall-patient worked with physical therapy Occupational Therapy.  Patient near baseline.      The patient expressed appropriate understanding of, and agreement with the discharge recommendations, medications, and plan.     Consults this admission:  IP CONSULT TO PHARMACY    Discharge Diagnosis:   Rhabdomyolysis        Discharge Instruction:   Follow up appointments:   Primary care physician: Naomie Mayer DO within 2 weeks  Diet: regular diet   Activity: activity as tolerated  Disposition: Discharged to:   [x] Memory care, [][facility], []SNF, []Acute Rehab, []Hospice   Condition on discharge: Stable  Labs and Tests to be Followed up as an outpatient by PCP or Specialist:     Discharge Medications:        Medication List        CONTINUE taking these

## 2024-11-12 NOTE — PROGRESS NOTES
Physician Progress Note      PATIENT:               MOHIT JUNIOR  CSN #:                  [id number]  :                       1950  ADMIT DATE:       2024 10:37 PM  DISCH DATE:  RESPONDING  PROVIDER #:        Rodolfo Peraza MD          QUERY TEXT:    Dear Dr. Peraza,  Pt admitted with Rhabdomyolysis and s/p fall. Pt noted to have an episode of   decreased responsiveness and Rapid Response determined to be \"Somnolence due   to versed administration\" . 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 11/10 5:22   AM, RN notes \"On admission to unit patient was lethargic but responsive to   name and speaking in full sentences. Writer returned to room approximately   five minutes later and patient seemed more lethargic. Patient did not respond   to name being called. Painful stimuli aroused patient but still very lethargic   and mumbling incoherently.\", PCP notes \"Patient responding to sternal rub,   Pinpoint and reactive pupils, Somnolence due to versed administration\"  Treatment: Rapid Response called, ABGs, labs, VS, IVF, Hold Norvasc and   Proscar for low B/P, \"Will not reverse with flumazenil due to causing   paradoxical agitation.\"  Thank you,  Ritika Castillo RN, CDS  Options provided:  -- Drug-induced encephalopathy due to Versed  -- Other - I will add my own diagnosis  -- Disagree - Not applicable / Not valid  -- Disagree - Clinically unable to determine / Unknown  -- Refer to Clinical Documentation Reviewer    PROVIDER RESPONSE TEXT:    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  
4 Eyes Skin Assessment     NAME:  Betito Bray  YOB: 1950  MEDICAL RECORD NUMBER:  [id number]    The patient is being assessed for  Admission    I agree that at least one RN has performed a thorough Head to Toe Skin Assessment on the patient. ALL assessment sites listed below have been assessed.      Areas assessed by both nurses:    Head, Face, Ears, Shoulders, Back, Chest, Arms, Elbows, Hands, Sacrum. Buttock, Coccyx, Ischium, Legs. Feet and Heels, Under Medical Devices , and Other          Does the Patient have a Wound? No noted wound(s)       Phillip Prevention initiated by RN: Yes  Wound Care Orders initiated by RN: No    Pressure Injury (Stage 3,4, Unstageable, DTI, NWPT, and Complex wounds) if present, place Wound referral order by RN under : No    New Ostomies, if present place, Ostomy referral order under : No     Nurse 1 eSignature: Electronically signed by Andrsesa Maria RN on 11/10/24 at 4:30 AM EST    **SHARE this note so that the co-signing nurse can place an eSignature**    Nurse 2 eSignature: Electronically signed by Ananya Jefferson RN on 11/10/24 at 6:24 AM EST   
Occupational Therapy  Facility/Department: [address] SURGERY  Occupational Therapy Initial Assessment, Tx and DC    Name: Betito Bray  : 1950  MRN: [id number]  Date of Service: 2024    Discharge Recommendations:  Long Term Care without OT  OT Equipment Recommendations  Other: Pt has needed DME       Patient Diagnosis(es): The primary encounter diagnosis was Altered mental status, unspecified altered mental status type. A diagnosis of Non-traumatic rhabdomyolysis was also pertinent to this visit.  Past Medical History:  has a past medical history of BPH with obstruction/lower urinary tract symptoms, COPD (chronic obstructive pulmonary disease) (HCC), History of hepatitis C, and Hypertension.  Past Surgical History:  has a past surgical history that includes chest tube insertion (2020).           Assessment  Performance deficits / Impairments: Decreased endurance  Assessment: Pt is 74 y.o male who presents [facility]. Pt presents functioning close to baseline and completes functional transfers, functional mobility and ADLs with SBA. Pt with no further acute OT needs, will d.c. services. Please re-consult should a new need arise.  Prognosis: Good  Decision Making: Medium Complexity  REQUIRES OT FOLLOW-UP: Yes  Activity Tolerance  Activity Tolerance: Patient Tolerated treatment well;Treatment limited secondary to decreased cognition     Plan  Occupational Therapy Plan  Times Per Week: 1x visit only    Restrictions  Restrictions/Precautions  Restrictions/Precautions: Up as Tolerated  Position Activity Restriction  Other position/activity restrictions: up as tolerated    Subjective  General  Chart Reviewed: Yes  Patient assessed for rehabilitation services?: Yes  Additional Pertinent Hx: 74 y.o male who p/w AMS after being found down at [facility].  CTH (-); CT C spine: degenerative changes, centrilobular emphysema; CXR (-); XR pelvis (-)    PMH: schizophrenia, vascular dementia, COPD, cirrhosis 2/2 
On admission to unit patient was lethargic but responsive to name and speaking in full sentences. Writer returned to room approximately five minutes later and patient seemed more lethargic. Patient did not respond to name being called. Painful stimuli aroused patient but still very lethargic and mumbling incoherently. When writer attempted to assess pupils patient closed eyes very tightly. Blood sugar 120. Patient still non-responsive with exception to painful stimuli. Pupils pin point and non-reactive. VSS, see flowsheet. Rapid response initiated by writer. ICU team and PCP at bedside to assess. ABG lab collected at bedside. Patient desaturated to 88% during rapid response. RT placed patient on NC 3L/min. Patient still minimally responsive at this time, not alert and only responsive to pain. Pupils pin point with sluggish reaction. ICU rapid response team aware.    /67   Pulse 63   Temp 97.3 °F (36.3 °C) (Axillary)   Resp 18   Ht 1.727 m (5' 7.99\")   Wt 74.5 kg (164 lb 4.8 oz)   SpO2 94%   BMI 24.99 kg/m²     Electronically signed by Andressa Maria RN on 11/10/2024 at 5:30 AM    
Patient admitted to room 5309 from ED. Patient is A&O x 1. VSS. Patient oriented to the room all safety measures in place. Patient given IS and SCDs at this time. Admission orders released and patient 4 eyes completed. Admission documentation completed. No other needs are noted at this time.    [x] Bed alarm on and cord plugged into wall  [x] Bed in lowest position  [x] Call light and bedside table within reach  [x] Patient educated on all safety measures  []Oxygen connected to wall (if applicable)     Nurse 1 Esignature: Electronically signed by Andressa Maria RN on 11/10/24 at 4:31 AM EST  Nurse 2 Esignature: Electronically signed by Ananya Jefferson RN on 11/10/24 at 6:24 AM EST   
Patient still only responsive to sternal rub and only briefly opens eyes when stimulated.  /68   Pulse 61   Temp 97.4 °F (36.3 °C) (Axillary)   Resp 18   Ht 1.727 m (5' 7.99\")   Wt 74.5 kg (164 lb 4.8 oz)   SpO2 98%   BMI 24.99 kg/m²  Dr. Hardwick made aware.      Electronically signed by Andressa Maria RN on 11/10/2024 at 6:07 AM    
Physical Therapy  Facility/Department: [address]  Physical Therapy Initial Assessment, Treatment and DC Summary     Name: Betito Bray  : 1950  MRN: [id number]  Date of Service: 2024    Discharge Recommendations:  Long Term Care without PT   PT Equipment Recommendations  Equipment Needed: No      Patient Diagnosis(es): The primary encounter diagnosis was Altered mental status, unspecified altered mental status type. A diagnosis of Non-traumatic rhabdomyolysis was also pertinent to this visit.  Past Medical History:  has a past medical history of BPH with obstruction/lower urinary tract symptoms, COPD (chronic obstructive pulmonary disease) (HCC), History of hepatitis C, and Hypertension.  Past Surgical History:  has a past surgical history that includes chest tube insertion (2020).    Assessment  Assessment: Pt presents from memory care unit vs [facility] s/p fall. He was agreeable to PT evaluation/treatment and tolerated with no adverse events. Pt demo'd the ability to mobilize on level surfaces wtih CGA<>SBA wtih no overt LOB. He appears to be functioning at/near his mobility baseline and with no acute care PT needs. Pt will be DC'd from PT services. Please re-refer should new needs arise. Thank you. Recommend pt return to his previous living arrangements.  Treatment Diagnosis: dec'd mobility  Therapy Prognosis: Good  Decision Making: Medium Complexity  Barriers to Learning: cognition  Requires PT Follow-Up: No  Activity Tolerance  Activity Tolerance: Patient tolerated evaluation without incident    Plan  Physical Therapy Plan  General Plan: Discharge with evaluation only  Safety Devices  Type of Devices: Nurse notified, Chair alarm in place, Telesitter in use, Left in chair, Call light within reach  Restraints  Restraints Initially in Place: No    Restrictions  Restrictions/Precautions  Restrictions/Precautions: Up as Tolerated  Position Activity Restriction  Other position/activity 
Pt discharged with transport to [facility]. IV removed. All belongings with pt. Report called to receiving nurse at facility. All questions answered. Electronically signed by Alicia Macias RN on 11/12/2024 at 4:08 PM   
Pt has been A&O to person, disoriented to all else. Pt has difficulty following directions but has been agreeable. Pt has been impulsive and was attempting multiple times to get OOB without assistance so [employer] camera was placed for safety. Pt had removed his external catheter but has been able to make bathroom needs known during dayshift. Pt tolerating diet. Pt's daughter called this shift and was updated.     Electronically signed by Miller Galvan RN on 11/11/2024 at 6:45 PM   
VSS. On arrival this am patient was not responsive but vitals stable. At this time patient is alert and oriented to self, up in the chair, denies any pain and tolerating a regular diet with no issues. Continues on 1 liter on oxygen via nasal cannula. External catheter is in place. Ambulated with sba and gait belt. Call light is within reach and alarm is on chair, no needs at this time.     Electronically signed by Meredith Castillo RN on 11/10/2024 at 3:25 PM    
    Impression: 1.  No acute fracture or dislocation. Electronically signed by Henry Ibarra MD    XR CHEST PORTABLE    Result Date: 11/9/2024  EXAM: PORTABLE AP CHEST X-RAY, 11/9/2024 INDICATION: syncope COMPARISON: 3/15/2020 FINDINGS: HEART / MEDIASTINUM: Normal in size. LUNGS/PLEURA: No focal consolidation, pulmonary edema, pneumothorax or pleural effusion BONES / SOFT TISSUES: No acute abnormality. OTHER: None.     1. No evidence for acute cardiopulmonary disease Electronically signed by Truman Guerra MD      CBC:   Recent Labs     11/09/24 2306   WBC 9.1   HGB 15.4        BMP:    Recent Labs     11/09/24  2306 11/10/24  0901    142   K 4.5 4.0    106   CO2 21 26   BUN 22* 21*   CREATININE 0.9 0.8   GLUCOSE 142* 121*     Hepatic:   Recent Labs     11/09/24 2306   *   ALT 22   BILITOT 0.5   ALKPHOS 68     Lipids: No results found for: \"CHOL\", \"HDL\", \"TRIG\"  Hemoglobin A1C: No results found for: \"LABA1C\"  TSH:   Lab Results   Component Value Date/Time    TSH 1.05 11/09/2024 11:06 PM     Troponin: No results found for: \"TROPONINT\"  Lactic Acid:   Recent Labs     11/09/24 2305   LACTA 2.7*     BNP:   Recent Labs     11/09/24 2306   PROBNP <36     UA:  Lab Results   Component Value Date/Time    NITRU Negative 11/10/2024 12:39 AM    COLORU Yellow 11/10/2024 12:39 AM    PHUR 6.0 11/10/2024 12:39 AM    WBCUA 0-2 11/10/2024 12:39 AM    RBCUA 5-10 11/10/2024 12:39 AM    MUCUS 1+ 11/10/2024 12:39 AM    BACTERIA 2+ 11/10/2024 12:39 AM    CLARITYU Clear 11/10/2024 12:39 AM    LEUKOCYTESUR Negative 11/10/2024 12:39 AM    UROBILINOGEN 1.0 11/10/2024 12:39 AM    BILIRUBINUR Negative 11/10/2024 12:39 AM    BLOODU SMALL 11/10/2024 12:39 AM    GLUCOSEU Negative 11/10/2024 12:39 AM    KETUA Negative 11/10/2024 12:39 AM    AMORPHOUS 1+ 11/10/2024 12:39 AM     Urine Cultures: No results found for: \"LABURIN\"  Blood Cultures: No results found for: \"BC\"  No results found for: \"BLOODCULT2\"  Organism: No 
signed by Truman Guerra MD    XR PELVIS (1-2 VIEWS)    Result Date: 11/9/2024  Exam Type: XR PELVIS (1-2 VIEWS) Indication: trauma Comparison: None Findings: Bones: The osseous structures are well mineralized. No acute fracture or dislocation. Joint: Normal anatomic alignment. Preserved joint spaces. Soft tissues: No significant soft tissue swelling.     Impression: 1.  No acute fracture or dislocation. Electronically signed by Henry Ibarra MD    XR CHEST PORTABLE    Result Date: 11/9/2024  EXAM: PORTABLE AP CHEST X-RAY, 11/9/2024 INDICATION: syncope COMPARISON: 3/15/2020 FINDINGS: HEART / MEDIASTINUM: Normal in size. LUNGS/PLEURA: No focal consolidation, pulmonary edema, pneumothorax or pleural effusion BONES / SOFT TISSUES: No acute abnormality. OTHER: None.     1. No evidence for acute cardiopulmonary disease Electronically signed by Truman Guerra MD      CBC:   Recent Labs     11/09/24 2306   WBC 9.1   HGB 15.4        BMP:    Recent Labs     11/09/24  2306 11/10/24  0901 11/11/24  0546    142 141   K 4.5 4.0 3.7    106 108   CO2 21 26 24   BUN 22* 21* 14   CREATININE 0.9 0.8 0.6*   GLUCOSE 142* 121* 80     Hepatic:   Recent Labs     11/09/24 2306   *   ALT 22   BILITOT 0.5   ALKPHOS 68     Lipids: No results found for: \"CHOL\", \"HDL\", \"TRIG\"  Hemoglobin A1C: No results found for: \"LABA1C\"  TSH:   Lab Results   Component Value Date/Time    TSH 1.05 11/09/2024 11:06 PM     Troponin: No results found for: \"TROPONINT\"  Lactic Acid:   Recent Labs     11/09/24 2305   LACTA 2.7*     BNP:   Recent Labs     11/09/24 2306   PROBNP <36     UA:  Lab Results   Component Value Date/Time    NITRU Negative 11/10/2024 12:39 AM    COLORU Yellow 11/10/2024 12:39 AM    PHUR 6.0 11/10/2024 12:39 AM    WBCUA 0-2 11/10/2024 12:39 AM    RBCUA 5-10 11/10/2024 12:39 AM    MUCUS 1+ 11/10/2024 12:39 AM    BACTERIA 2+ 11/10/2024 12:39 AM    CLARITYU Clear 11/10/2024 12:39 AM    LEUKOCYTESUR Negative

## 2024-11-12 NOTE — DISCHARGE INSTR - COC
Continuity of Care Form    Patient Name: Betito Bray   :  1950  MRN:  [id number]    Admit date:  2024  Discharge date:  2024    Code Status Order: Full Code   Advance Directives:   Advance Care Flowsheet Documentation             Admitting Physician:  Cosme Hardwick MD  PCP: Naomie Mayer DO    Discharging Nurse: Alicia  Discharging Hospital Unit/Room#: 5309/5309-01  Discharging Unit Phone Number: [phone]    Emergency Contact:   Extended Emergency Contact Information  Primary Emergency Contact: ARABELLAAPRIL  Home Phone: [phone]  Relation: Child    Past Surgical History:  Past Surgical History:   Procedure Laterality Date    CHEST TUBE INSERTION  2020    R sided chest - s/p trauma       Immunization History:     There is no immunization history on file for this patient.    Active Problems:  Patient Active Problem List   Diagnosis Code    Essential hypertension I10    COPD mixed type (HCC) J44.9    BPH with obstruction/lower urinary tract symptoms N40.1, N13.8    History of hepatitis C Z86.19    Rhabdomyolysis M62.82       Isolation/Infection:   Isolation            No Isolation          Patient Infection Status       None to display            Nurse Assessment:  Last Vital Signs: /73   Pulse 69   Temp 98.9 °F (37.2 °C) (Oral)   Resp 16   Ht 1.727 m (5' 7.99\")   Wt 74.5 kg (164 lb 4.8 oz)   SpO2 93%   BMI 24.99 kg/m²     Last documented pain score (0-10 scale):    Last Weight:   Wt Readings from Last 1 Encounters:   24 74.5 kg (164 lb 4.8 oz)     Mental Status:  oriented and alert    IV Access:  - None    Nursing Mobility/ADLs:  Walking   Assisted  Transfer  Assisted  Bathing  Assisted  Dressing  Assisted  Toileting  Assisted  Feeding  Independent  Med Admin  Assisted  Med Delivery   whole    Wound Care Documentation and Therapy:        Elimination:  Continence:   Bowel: Yes  Bladder: Yes  Urinary Catheter: None   Colostomy/Ileostomy/Ileal Conduit: No

## 2025-07-18 DIAGNOSIS — F41.9 ANXIETY: ICD-10-CM

## 2025-07-18 DIAGNOSIS — R52 GENERALIZED PAIN: ICD-10-CM

## 2025-07-18 DIAGNOSIS — Z51.5 HOSPICE CARE PATIENT: Primary | ICD-10-CM

## 2025-07-18 RX ORDER — MORPHINE SULFATE 100 MG/5ML
10 SOLUTION ORAL
Qty: 30 ML | Refills: 0 | Status: SHIPPED | OUTPATIENT
Start: 2025-07-18 | End: 2025-08-01

## 2025-07-18 RX ORDER — LORAZEPAM 2 MG/ML
1 CONCENTRATE ORAL EVERY 8 HOURS PRN
Qty: 30 ML | Refills: 0 | Status: SHIPPED | OUTPATIENT
Start: 2025-07-18 | End: 2025-08-07

## 2025-07-18 RX ORDER — HYOSCYAMINE SULFATE 0.12 MG/1
0.12 TABLET SUBLINGUAL
Qty: 90 TABLET | Refills: 0 | Status: SHIPPED | OUTPATIENT
Start: 2025-07-18

## (undated) RX ORDER — METOPROLOL TARTRATE 1 MG/ML
INJECTION, SOLUTION INTRAVENOUS
Status: DISPENSED
Start: 2019-03-12

## (undated) RX ORDER — METOPROLOL TARTRATE 100 MG
TABLET ORAL
Status: DISPENSED
Start: 2019-03-12